# Patient Record
Sex: MALE | Race: WHITE | NOT HISPANIC OR LATINO | Employment: FULL TIME | URBAN - METROPOLITAN AREA
[De-identification: names, ages, dates, MRNs, and addresses within clinical notes are randomized per-mention and may not be internally consistent; named-entity substitution may affect disease eponyms.]

---

## 2017-01-22 ENCOUNTER — HOSPITAL ENCOUNTER (EMERGENCY)
Facility: HOSPITAL | Age: 37
Discharge: HOME | End: 2017-01-22
Attending: EMERGENCY MEDICINE | Admitting: EMERGENCY MEDICINE
Payer: COMMERCIAL

## 2017-01-22 ENCOUNTER — APPOINTMENT (EMERGENCY)
Dept: RADIOLOGY | Facility: HOSPITAL | Age: 37
End: 2017-01-22
Payer: COMMERCIAL

## 2017-01-22 ENCOUNTER — GENERIC CONVERSION - ENCOUNTER (OUTPATIENT)
Dept: OTHER | Facility: OTHER | Age: 37
End: 2017-01-22

## 2017-01-22 ENCOUNTER — APPOINTMENT (EMERGENCY)
Dept: CT IMAGING | Facility: HOSPITAL | Age: 37
End: 2017-01-22
Payer: COMMERCIAL

## 2017-01-22 VITALS
OXYGEN SATURATION: 98 % | SYSTOLIC BLOOD PRESSURE: 142 MMHG | DIASTOLIC BLOOD PRESSURE: 71 MMHG | HEIGHT: 73 IN | TEMPERATURE: 99.4 F | HEART RATE: 117 BPM | BODY MASS INDEX: 39.76 KG/M2 | WEIGHT: 300 LBS | RESPIRATION RATE: 17 BRPM

## 2017-01-22 DIAGNOSIS — N17.9 ACUTE NONTRAUMATIC KIDNEY INJURY (HCC): ICD-10-CM

## 2017-01-22 DIAGNOSIS — E86.0 MILD DEHYDRATION: Primary | ICD-10-CM

## 2017-01-22 LAB
ALBUMIN SERPL BCP-MCNC: 4.1 G/DL (ref 3.5–5)
ALP SERPL-CCNC: 50 U/L (ref 46–116)
ALT SERPL W P-5'-P-CCNC: 34 U/L (ref 12–78)
ANION GAP SERPL CALCULATED.3IONS-SCNC: 15 MMOL/L (ref 4–13)
AST SERPL W P-5'-P-CCNC: 15 U/L (ref 5–45)
BASOPHILS # BLD AUTO: 0.03 THOUSANDS/ΜL (ref 0–0.1)
BASOPHILS NFR BLD AUTO: 0 % (ref 0–1)
BILIRUB SERPL-MCNC: 1.2 MG/DL (ref 0.2–1)
BILIRUB UR QL STRIP: NEGATIVE
BUN SERPL-MCNC: 19 MG/DL (ref 5–25)
CALCIUM SERPL-MCNC: 8.8 MG/DL (ref 8.3–10.1)
CHLORIDE SERPL-SCNC: 101 MMOL/L (ref 100–108)
CLARITY UR: CLEAR
CO2 SERPL-SCNC: 23 MMOL/L (ref 21–32)
COLOR UR: YELLOW
CREAT SERPL-MCNC: 1.36 MG/DL (ref 0.6–1.3)
DEPRECATED D DIMER PPP: 450 NG/ML (FEU) (ref 0–424)
EOSINOPHIL # BLD AUTO: 0.12 THOUSAND/ΜL (ref 0–0.61)
EOSINOPHIL NFR BLD AUTO: 1 % (ref 0–6)
ERYTHROCYTE [DISTWIDTH] IN BLOOD BY AUTOMATED COUNT: 13.2 % (ref 11.6–15.1)
GFR SERPL CREATININE-BSD FRML MDRD: 58.6 ML/MIN/1.73SQ M
GLUCOSE SERPL-MCNC: 116 MG/DL (ref 65–140)
GLUCOSE SERPL-MCNC: 125 MG/DL (ref 65–140)
GLUCOSE UR STRIP-MCNC: NEGATIVE MG/DL
HCT VFR BLD AUTO: 50 % (ref 36.5–49.3)
HGB BLD-MCNC: 17.8 G/DL (ref 12–17)
HGB UR QL STRIP.AUTO: NEGATIVE
KETONES UR STRIP-MCNC: NEGATIVE MG/DL
LACTATE SERPL-SCNC: 1.9 MMOL/L (ref 0.5–2)
LEUKOCYTE ESTERASE UR QL STRIP: NEGATIVE
LYMPHOCYTES # BLD AUTO: 1 THOUSANDS/ΜL (ref 0.6–4.47)
LYMPHOCYTES NFR BLD AUTO: 8 % (ref 14–44)
MCH RBC QN AUTO: 30 PG (ref 26.8–34.3)
MCHC RBC AUTO-ENTMCNC: 35.6 G/DL (ref 31.4–37.4)
MCV RBC AUTO: 84 FL (ref 82–98)
MONOCYTES # BLD AUTO: 0.54 THOUSAND/ΜL (ref 0.17–1.22)
MONOCYTES NFR BLD AUTO: 4 % (ref 4–12)
NEUTROPHILS # BLD AUTO: 11.2 THOUSANDS/ΜL (ref 1.85–7.62)
NEUTS SEG NFR BLD AUTO: 87 % (ref 43–75)
NITRITE UR QL STRIP: NEGATIVE
NT-PROBNP SERPL-MCNC: 19 PG/ML
PH UR STRIP.AUTO: 6 [PH] (ref 4.5–8)
PLATELET # BLD AUTO: 229 THOUSANDS/UL (ref 149–390)
PMV BLD AUTO: 12 FL (ref 8.9–12.7)
POTASSIUM SERPL-SCNC: 3.9 MMOL/L (ref 3.5–5.3)
PROT SERPL-MCNC: 8.4 G/DL (ref 6.4–8.2)
PROT UR STRIP-MCNC: NEGATIVE MG/DL
RBC # BLD AUTO: 5.94 MILLION/UL (ref 3.88–5.62)
SODIUM SERPL-SCNC: 139 MMOL/L (ref 136–145)
SP GR UR STRIP.AUTO: <=1.005 (ref 1–1.03)
TROPONIN I SERPL-MCNC: <0.02 NG/ML
UROBILINOGEN UR QL STRIP.AUTO: 0.2 E.U./DL
WBC # BLD AUTO: 12.89 THOUSAND/UL (ref 4.31–10.16)

## 2017-01-22 PROCEDURE — 93005 ELECTROCARDIOGRAM TRACING: CPT | Performed by: EMERGENCY MEDICINE

## 2017-01-22 PROCEDURE — 83880 ASSAY OF NATRIURETIC PEPTIDE: CPT | Performed by: EMERGENCY MEDICINE

## 2017-01-22 PROCEDURE — 96360 HYDRATION IV INFUSION INIT: CPT

## 2017-01-22 PROCEDURE — 71010 HB CHEST X-RAY 1 VIEW FRONTAL (PORTABLE): CPT

## 2017-01-22 PROCEDURE — 80053 COMPREHEN METABOLIC PANEL: CPT | Performed by: EMERGENCY MEDICINE

## 2017-01-22 PROCEDURE — 83605 ASSAY OF LACTIC ACID: CPT | Performed by: EMERGENCY MEDICINE

## 2017-01-22 PROCEDURE — 84484 ASSAY OF TROPONIN QUANT: CPT | Performed by: EMERGENCY MEDICINE

## 2017-01-22 PROCEDURE — 96361 HYDRATE IV INFUSION ADD-ON: CPT

## 2017-01-22 PROCEDURE — 99285 EMERGENCY DEPT VISIT HI MDM: CPT

## 2017-01-22 PROCEDURE — 81003 URINALYSIS AUTO W/O SCOPE: CPT | Performed by: EMERGENCY MEDICINE

## 2017-01-22 PROCEDURE — 85379 FIBRIN DEGRADATION QUANT: CPT | Performed by: EMERGENCY MEDICINE

## 2017-01-22 PROCEDURE — 87040 BLOOD CULTURE FOR BACTERIA: CPT | Performed by: EMERGENCY MEDICINE

## 2017-01-22 PROCEDURE — 36415 COLL VENOUS BLD VENIPUNCTURE: CPT | Performed by: EMERGENCY MEDICINE

## 2017-01-22 PROCEDURE — 85025 COMPLETE CBC W/AUTO DIFF WBC: CPT | Performed by: EMERGENCY MEDICINE

## 2017-01-22 PROCEDURE — 74177 CT ABD & PELVIS W/CONTRAST: CPT

## 2017-01-22 PROCEDURE — 82948 REAGENT STRIP/BLOOD GLUCOSE: CPT

## 2017-01-22 RX ORDER — SODIUM CHLORIDE 9 MG/ML
250 INJECTION, SOLUTION INTRAVENOUS CONTINUOUS
Status: DISCONTINUED | OUTPATIENT
Start: 2017-01-22 | End: 2017-01-22 | Stop reason: HOSPADM

## 2017-01-22 RX ADMIN — IOHEXOL 50 ML: 240 INJECTION, SOLUTION INTRATHECAL; INTRAVASCULAR; INTRAVENOUS; ORAL at 09:15

## 2017-01-22 RX ADMIN — SODIUM CHLORIDE 250 ML/HR: 0.9 INJECTION, SOLUTION INTRAVENOUS at 09:08

## 2017-01-22 RX ADMIN — IOHEXOL 100 ML: 350 INJECTION, SOLUTION INTRAVENOUS at 10:54

## 2017-01-24 ENCOUNTER — ALLSCRIPTS OFFICE VISIT (OUTPATIENT)
Dept: OTHER | Facility: OTHER | Age: 37
End: 2017-01-24

## 2017-01-24 DIAGNOSIS — I10 ESSENTIAL (PRIMARY) HYPERTENSION: ICD-10-CM

## 2017-01-24 DIAGNOSIS — K21.9 GASTRO-ESOPHAGEAL REFLUX DISEASE WITHOUT ESOPHAGITIS: ICD-10-CM

## 2017-01-24 LAB
ATRIAL RATE: 137 BPM
P AXIS: 56 DEGREES
PR INTERVAL: 156 MS
QRS AXIS: 20 DEGREES
QRSD INTERVAL: 82 MS
QT INTERVAL: 270 MS
QTC INTERVAL: 407 MS
T WAVE AXIS: 48 DEGREES
VENTRICULAR RATE: 137 BPM

## 2017-01-26 ENCOUNTER — HOSPITAL ENCOUNTER (OUTPATIENT)
Dept: NON INVASIVE DIAGNOSTICS | Facility: HOSPITAL | Age: 37
Discharge: HOME | End: 2017-01-26
Attending: INTERNAL MEDICINE
Payer: COMMERCIAL

## 2017-01-26 ENCOUNTER — GENERIC CONVERSION - ENCOUNTER (OUTPATIENT)
Dept: OTHER | Facility: OTHER | Age: 37
End: 2017-01-26

## 2017-01-26 DIAGNOSIS — I10 ESSENTIAL (PRIMARY) HYPERTENSION: ICD-10-CM

## 2017-01-26 LAB
BUN SERPL-MCNC: 14 MG/DL (ref 6–20)
BUN/CREA RATIO (HISTORICAL): 12 (ref 8–19)
CALCIUM SERPL-MCNC: 9.7 MG/DL (ref 8.7–10.2)
CHLORIDE SERPL-SCNC: 100 MMOL/L (ref 96–106)
CO2 SERPL-SCNC: 25 MMOL/L (ref 18–29)
CREAT SERPL-MCNC: 1.15 MG/DL (ref 0.76–1.27)
EGFR AFRICAN AMERICAN (HISTORICAL): 93 ML/MIN/1.73
EGFR-AMERICAN CALC (HISTORICAL): 80 ML/MIN/1.73
GLUCOSE SERPL-MCNC: 92 MG/DL (ref 65–99)
POTASSIUM SERPL-SCNC: 4.4 MMOL/L (ref 3.5–5.2)
SODIUM SERPL-SCNC: 140 MMOL/L (ref 134–144)

## 2017-01-26 PROCEDURE — 93306 TTE W/DOPPLER COMPLETE: CPT

## 2017-01-27 LAB — BACTERIA BLD CULT: NORMAL

## 2017-01-30 ENCOUNTER — GENERIC CONVERSION - ENCOUNTER (OUTPATIENT)
Dept: OTHER | Facility: OTHER | Age: 37
End: 2017-01-30

## 2017-01-31 ENCOUNTER — GENERIC CONVERSION - ENCOUNTER (OUTPATIENT)
Dept: OTHER | Facility: OTHER | Age: 37
End: 2017-01-31

## 2017-02-03 ENCOUNTER — GENERIC CONVERSION - ENCOUNTER (OUTPATIENT)
Dept: OTHER | Facility: OTHER | Age: 37
End: 2017-02-03

## 2017-02-03 ENCOUNTER — HOSPITAL ENCOUNTER (OUTPATIENT)
Dept: NUCLEAR MEDICINE | Facility: HOSPITAL | Age: 37
Discharge: HOME | End: 2017-02-03
Attending: INTERNAL MEDICINE
Payer: COMMERCIAL

## 2017-02-03 DIAGNOSIS — K21.9 GASTRO-ESOPHAGEAL REFLUX DISEASE WITHOUT ESOPHAGITIS: ICD-10-CM

## 2017-02-03 PROCEDURE — 78227 HEPATOBIL SYST IMAGE W/DRUG: CPT

## 2017-02-03 PROCEDURE — A9537 TC99M MEBROFENIN: HCPCS

## 2017-02-03 RX ADMIN — SINCALIDE 2.7 MCG: 5 INJECTION, POWDER, LYOPHILIZED, FOR SOLUTION INTRAVENOUS at 12:04

## 2017-02-07 ENCOUNTER — ALLSCRIPTS OFFICE VISIT (OUTPATIENT)
Dept: OTHER | Facility: OTHER | Age: 37
End: 2017-02-07

## 2017-02-08 ENCOUNTER — GENERIC CONVERSION - ENCOUNTER (OUTPATIENT)
Dept: OTHER | Facility: OTHER | Age: 37
End: 2017-02-08

## 2017-02-10 ENCOUNTER — GENERIC CONVERSION - ENCOUNTER (OUTPATIENT)
Dept: OTHER | Facility: OTHER | Age: 37
End: 2017-02-10

## 2017-03-24 ENCOUNTER — GENERIC CONVERSION - ENCOUNTER (OUTPATIENT)
Dept: OTHER | Facility: OTHER | Age: 37
End: 2017-03-24

## 2018-01-12 NOTE — RESULT NOTES
Verified Results  ECHO COMPLETE WITH CONTRAST IF INDICATED 2017 08:41AM [facility] Order Number: [id number]    - Patient Instructions: To schedule this appointment, please contact Central Scheduling at [phone]  For Ame Sullivan, please call [phone]  Test Name Result Flag Reference   ECHO COMPLETE WITH CONTRAST IF INDICATED (Report)     [address] [facility]   [place]   [facility], [address] [facility]   [phone]     Transthoracic Echocardiogram   2D, M-mode, Doppler, and Color Doppler     Study date: 2017     Patient: Enid Campuzano   MR number: [id number]   Account number: [de-identified]   : 1980   Age: 43 years   Gender: Male   Status: Outpatient   Location: Echo lab   Height: 73 in   Weight: 308 lb   BP: 120/ 82 mmHg     Indications: Hypertension  Diagnoses: I10  - Essential (primary) hypertension     Sonographer: Colleen ZURITA, RCS   Primary Physician: Apryl Lazo DO   Referring Physician: Apryl Lazo DO   Group: [facility]done[facility]   Interpreting Physician: Latosha Brantley MD     SUMMARY     LEFT VENTRICLE:   Systolic function was normal by visual assessment  Ejection fraction was estimated to be 60 %  There were no regional wall motion abnormalities  TRICUSPID VALVE:   There was trace regurgitation  HISTORY: PRIOR HISTORY: GERD,Hypertension  PROCEDURE: The procedure was performed in the echo lab  This was a routine study  The transthoracic approach was used  The study included complete 2D imaging, M-mode, complete spectral Doppler, and color Doppler  Images were obtained from   the parasternal, apical, subcostal, and suprasternal notch acoustic windows  Image quality was adequate  LEFT VENTRICLE: Size was normal  Systolic function was normal by visual assessment  Ejection fraction was estimated to be 60 %  There were no regional wall motion abnormalities   Wall thickness was normal  DOPPLER: The ratio of early ventricular filling to atrial contraction velocities was within the normal range  Left ventricular diastolic function parameters were normal      RIGHT VENTRICLE: The size was normal  Systolic function was normal  DOPPLER: The tricuspid jet envelope definition was inadequate for estimation of RV systolic pressure  There are no indirect findings (abnormal RV volume or geometry,   altered pulmonary flow velocity profile, or leftward septal displacement) which would suggest moderate or severe pulmonary hypertension  LEFT ATRIUM: Size was normal      RIGHT ATRIUM: Size was normal      MITRAL VALVE: Valve structure was normal  There was normal leaflet separation  DOPPLER: The transmitral velocity was within the normal range  There was no evidence for stenosis  There was no regurgitation  AORTIC VALVE: The valve was probably trileaflet  Leaflets exhibited normal thickness and normal cuspal separation  DOPPLER: Transaortic velocity was within the normal range  There was no evidence for stenosis  There was no regurgitation  TRICUSPID VALVE: The valve structure was normal  There was normal leaflet separation  DOPPLER: The transtricuspid velocity was within the normal range  There was no evidence for stenosis  There was trace regurgitation  PULMONIC VALVE: Leaflets exhibited normal thickness, no calcification, and normal cuspal separation  DOPPLER: The transpulmonic velocity was within the normal range  There was no regurgitation  PERICARDIUM: There was no pericardial effusion  AORTA: The root exhibited normal size  There was no evidence of coarctation, either anatomically, or by Doppler flow parameters  SYSTEMIC VEINS: IVC: The inferior vena cava was not well visualized       SYSTEM MEASUREMENT TABLES     2D   %FS: 25 15 %   Ao Diam: 3 67 cm   EDV(Teich): 84 07 ml   EF(Teich): 49 97 %   ESV(Cube): 33 85 ml   ESV(Teich): 42 05 ml   IVSd: 1 11 cm   LA Area: 12 82 cm2   LA Diam: 2 66 cm   LVEDV MOD A4C: 65 26 ml   LVEF MOD A4C: 54 18 %   LVESV MOD A4C: 29 9 ml   LVIDd: 4 32 cm   LVIDs: 3 23 cm   LVLd A4C: 7 63 cm   LVLs A4C: 7 2 cm   LVOT Diam: 2 43 cm   LVPWd: 1 21 cm   RA Area: 11 54 cm2   RV Diam : 4 3 cm   SV MOD A4C: 35 36 ml   SV(Cube): 46 87 ml   SV(Teich): 42 01 ml     MM   TAPSE: 1 99 cm     PW   AVC: 318 71 ms   E': 0 08 m/s   E/E': 6 76   MV A Berto: 0 53 m/s   MV Dec [place]: 2 03 m/s2   MV DecT: 262 45 ms   MV E Berto: 0 53 m/s   MV E/A Ratio: 1     [employer] Accredited Echocardiography Laboratory     Prepared and electronically signed by     Carole Kemp MD   Signed 26-Jan-2017 12:43:35

## 2018-01-12 NOTE — RESULT NOTES
Verified Results  * NM Albert Caicedo RX [id number] 11:05AM Nirmala Grigsby Order Number: [id number]    - Patient Instructions: To schedule this appointment, please contact Central Scheduling at [phone]  Test Name Result Flag Reference   NM HEPATOBILIARY W RX (Report)     HEPATOBILIARY SCAN WITH CHOLECYSTOKININ ADMINISTRATION      INDICATION: Right upper quadrant pain     COMPARISON: None available     TECHNIQUE:    Following the intravenous administration of 5 1 mCi Tc-99m labeled mebrofenin, dynamic abdominal images were obtained over a 60 minute time period  Images were performed in AP projection  FINDINGS:      There is prompt, uniform accumulation with normal clearance of the radiopharmaceutical by the liver  There is normal filling of the intrahepatic ducts, common bile duct and gallbladder with normal excretion of the radiopharmaceutical into the duodenum        In order to evaluate the contractile response of the gallbladder in response to cholecystokinin, 2 7 mcg sincalide (0 02 mcg/kg) was administered by slow intravenous infusion over 60 minutes  These images demonstrate diminished contraction of the    gallbladder  The calculated gallbladder ejection fraction is 34 % (N = >35%)  The patient experienced mild right abdominal pain after CCK administration       IMPRESSION:     1  Diminished contractile response of the gallbladder to cholecystokinin infusion  This may be associated with chronic gallbladder dysfunction in the appropriate setting         Workstation performed: [id number]     Signed by:   Gerald Da Silva MD   2/3/17

## 2018-01-13 NOTE — MISCELLANEOUS
Message  Return to work or school:   Vianey Sebastian is under my professional care   He was seen in my office on 02/07/2017   He is able to return to work on  02/13/2017            Signatures   Electronically signed by : TAMARA Walker ; Feb 14 2017  8:22PM EST

## 2018-01-14 VITALS
HEART RATE: 60 BPM | TEMPERATURE: 96.5 F | BODY MASS INDEX: 39.88 KG/M2 | SYSTOLIC BLOOD PRESSURE: 112 MMHG | WEIGHT: 302.31 LBS | DIASTOLIC BLOOD PRESSURE: 86 MMHG

## 2018-01-14 VITALS
BODY MASS INDEX: 40.64 KG/M2 | HEART RATE: 80 BPM | SYSTOLIC BLOOD PRESSURE: 120 MMHG | DIASTOLIC BLOOD PRESSURE: 82 MMHG | WEIGHT: 308 LBS

## 2018-01-16 NOTE — MISCELLANEOUS
Message  Message Free Text Note Form: Patient was seen by Dr Yo Saunders in the ER, 43year old with fever and SOB  Does not have a primary care physician  We will take him in our office  Will be calling Monday for an appointment  Put him in with Dr Alanna Saunders later in the week        Signatures   Electronically signed by : Jeanette Polo DO; Jan 24 2017  8:35AM EST                       (Author)

## 2018-08-24 ENCOUNTER — OFFICE VISIT (OUTPATIENT)
Dept: FAMILY MEDICINE CLINIC | Facility: HOSPITAL | Age: 38
End: 2018-08-24
Payer: COMMERCIAL

## 2018-08-24 VITALS
WEIGHT: 315 LBS | BODY MASS INDEX: 41.75 KG/M2 | SYSTOLIC BLOOD PRESSURE: 142 MMHG | DIASTOLIC BLOOD PRESSURE: 96 MMHG | HEIGHT: 73 IN | HEART RATE: 89 BPM

## 2018-08-24 DIAGNOSIS — R00.0 TACHYCARDIA: ICD-10-CM

## 2018-08-24 DIAGNOSIS — I10 BENIGN ESSENTIAL HYPERTENSION: Primary | ICD-10-CM

## 2018-08-24 DIAGNOSIS — E66.01 MORBID OBESITY (HCC): Chronic | ICD-10-CM

## 2018-08-24 DIAGNOSIS — M54.50 CHRONIC MIDLINE LOW BACK PAIN WITHOUT SCIATICA: ICD-10-CM

## 2018-08-24 DIAGNOSIS — K21.9 GERD WITHOUT ESOPHAGITIS: ICD-10-CM

## 2018-08-24 DIAGNOSIS — G89.29 CHRONIC MIDLINE LOW BACK PAIN WITHOUT SCIATICA: ICD-10-CM

## 2018-08-24 PROBLEM — E78.1 HYPERTRIGLYCERIDEMIA: Status: ACTIVE | Noted: 2018-08-24

## 2018-08-24 PROBLEM — R51.9 FREQUENT HEADACHES: Status: ACTIVE | Noted: 2017-01-24

## 2018-08-24 PROBLEM — M54.9 CHRONIC BACK PAIN: Status: ACTIVE | Noted: 2017-01-24

## 2018-08-24 PROCEDURE — 3008F BODY MASS INDEX DOCD: CPT | Performed by: INTERNAL MEDICINE

## 2018-08-24 PROCEDURE — 99214 OFFICE O/P EST MOD 30 MIN: CPT | Performed by: INTERNAL MEDICINE

## 2018-08-24 PROCEDURE — 1036F TOBACCO NON-USER: CPT | Performed by: INTERNAL MEDICINE

## 2018-08-24 RX ORDER — LOSARTAN POTASSIUM 100 MG/1
100 TABLET ORAL DAILY
Qty: 90 TABLET | Refills: 3 | Status: SHIPPED | OUTPATIENT
Start: 2018-08-24 | End: 2019-03-29 | Stop reason: DRUGHIGH

## 2018-08-24 RX ORDER — PANTOPRAZOLE SODIUM 40 MG/1
1 TABLET, DELAYED RELEASE ORAL DAILY
COMMUNITY
Start: 2017-01-24 | End: 2018-08-24 | Stop reason: ALTCHOICE

## 2018-08-24 NOTE — PATIENT INSTRUCTIONS
Core Strengthening Exercises   WHAT YOU NEED TO KNOW:   Your core includes the muscles of your lower back, hip, pelvis, and abdomen  Core strengthening exercises help heal and strengthen these muscles  This helps prevent another injury, and keeps your pelvis, spine, and hips in the correct position  DISCHARGE INSTRUCTIONS:   Contact your healthcare provider if:   · You have sharp or worsening pain during exercise or at rest     · You have questions or concerns about your shoulder exercises  Safety tips:  Talk to your healthcare provider before you start an exercise program  A physical therapist can teach you how to do core strengthening exercises safely  · Do the exercises on a mat or firm surface  A firm surface will support your spine and avoid low back pain  Do not do these exercises on a bed  · Move slowly and smoothly  Avoid fast or jerky motions  · Stop if you feel pain  Core exercises should not feel painful  Stop if you feel pain  · Breathe normally during core exercises  Do not hold your breath  This may cause an increase in blood pressure and prevent muscle strengthening  Your healthcare provider will tell you when to inhale and exhale during the exercise  · Begin all of your exercises with abdominal bracing  Abdominal bracing helps warm up your core muscles  You can also practice abdominal bracing throughout the day while you are sitting or standing  Lie on your back with your knees bent and feet flat on the floor  Place your arms in a relaxed position beside your body  Tighten your abdominal muscles  Pull your belly button in and up toward your spine  Hold for 5 seconds  Relax your muscles  Repeat 10 times  Core strengthening exercises: Your healthcare provider will tell you how often to do these exercises  The provider will also tell you how many repetitions of each exercise you should do  Hold each exercise for 5 seconds or as directed   As you get stronger, increase your hold to 10 to 15 seconds  You can do some of these exercises on a stability ball, or with a weight  Ask your healthcare provider how to use a stability ball or weight for these exercises:  · Bent leg side bridge:  Lie on one side with your legs, hips, and shoulders in a straight line  Prop yourself up onto your forearm so your elbow is directly below your shoulder  Bend your knees to 90°  Lift your hips off the floor  Balance yourself on your forearm and the side of your knee  Hold this position  Repeat on the other side  · Straight leg side bridge:  Lie on one side with your legs, hips, and shoulders in a straight line  Prop yourself up onto your forearm so your elbow is directly below your shoulder  Your top leg can rest in front of your bottom leg for support  Lift your hips off the floor  Balance yourself on your forearm and the outside of your flexed foot  Do not let your ankle bend sideways  Hold this position  Repeat on the other side  · Superman:  Lie on your stomach  Extend your arms forward on the floor  Tighten your abdominal muscles and lift your right hand and left leg off the floor  Hold this position  Slowly return to the starting position  Tighten your abdominal muscles and lift your left hand and right leg off the floor  Hold this position  Slowly return to the starting position  · Curl up:  Lie on your back with your knees bent and feet flat on the floor  Place your hands, palms down, underneath your lower back  Next, with your elbows on the floor, lift your shoulders and chest 2 to 3 inches off the floor  Keep your head in line with your shoulders  Hold this position  Slowly return to the starting position  · Straight leg raises:  Lie on your back with one leg straight  Bend the other knee and place your foot flat on the floor  Tighten your abdominal muscles  Keep your leg straight and slowly lift it straight up 6 to 12 inches off the floor   Hold this position  Lower your leg slowly  Do as many repetitions as directed on this side  Repeat with the other leg  · Plank:  Lie on your stomach  Bend your elbows and place your forearms flat on the floor  Lift your chest, stomach, and knees off the floor  Make sure your elbows are below your shoulders  Your body should be in a straight line  Do not let your hips or lower back sink to the ground  Squeeze your abdominal muscles together and hold for 15 seconds  To make this exercise harder, hold for 30 seconds or lift 1 leg at a time  · Bicycles:  Lie on your back  Bend both knees and bring them toward your chest  Your calves should be parallel to the floor  Place the palms of your hands on the back of your head  Straighten your right leg and keep it lifted 2 inches off the floor  Raise your head and shoulders off the floor and twist towards your left  Keep your head and shoulders lifted  Bend your right knee while you straighten your left leg  Keep your left leg 2 inches off the floor  Twist your head and chest towards the left leg  Continue to straighten 1 leg at a time and twist        Follow up with your healthcare provider as directed:  Write down your questions so you remember to ask them during your visits  © 2017 [address] Information is for End User's use only and may not be sold, redistributed or otherwise used for commercial purposes  All illustrations and images included in CareNotes® are the copyrighted property of [employer]  or Lamonte French  The above information is an  only  It is not intended as medical advice for individual conditions or treatments  Talk to your doctor, nurse or pharmacist before following any medical regimen to see if it is safe and effective for you

## 2018-08-24 NOTE — ASSESSMENT & PLAN NOTE
No severe symptoms currently - had been on protonix in past  Had prior egd with Dr Dania Spaulding   Discussed avoiding Nsaids also

## 2018-08-24 NOTE — ASSESSMENT & PLAN NOTE
Had stopped his ramipril about 2 months ago- mix up with rx at pharmacy and rx d/annie  He was following on home monitor and doing workout at gym and dietary for first  Month was ok then increasing- was over 160 last night    Had some dry cough with ACE in past- discussed starting losartan instead

## 2018-08-24 NOTE — PROGRESS NOTES
Assessment/Plan:             Problem List Items Addressed This Visit        Digestive    GERD without esophagitis     No severe symptoms currently - had been on protonix in past  Had prior egd with Dr Dick Rizvi  Discussed avoiding Nsaids also            Cardiovascular and Mediastinum    Benign essential hypertension - Primary     Had stopped his ramipril about 2 months ago- mix up with rx at pharmacy and rx d/annie  He was following on home monitor and doing workout at gym and dietary for first  Month was ok then increasing- was over 160 last night  Had some dry cough with ACE in past- discussed starting losartan instead            Other    Chronic back pain     Works at [employer] and has some AM stiffness  Drives an hour to work and bothers him if sitting too long                 Subjective:      Patient ID: Joaquim Ulloa is a 42 y o  male    1 htn- some occasional am headache which he attributes to his sinuses  2  Obesity- discussed organized weight loss programs- max was 330-  3  Lipids- had yyearly screening at work- had elevated triglycerides- he will get us a copy from work  Hypertension   Pertinent negatives include no chest pain or shortness of breath  The following portions of the patient's history were reviewed and updated as appropriate: allergies, current medications and problem list      Review of Systems   Constitutional: Negative for chills, fatigue and fever  HENT: Negative for congestion, postnasal drip and rhinorrhea  Respiratory: Negative for apnea and shortness of breath  Cardiovascular: Negative for chest pain  High heart rates at times 90- 105 at rest   Gastrointestinal: Negative for abdominal distention and abdominal pain  All other systems reviewed and are negative  Objective:    No current outpatient prescriptions on file  Blood pressure 142/96, pulse 89, height 6' 1" (1 854 m), weight (!) 143 kg (316 lb)       Physical Exam   Constitutional: He is oriented to person, place, and time  He appears well-developed and well-nourished  No distress  HENT:   Head: Normocephalic  Right Ear: External ear normal    Left Ear: External ear normal    Nose: Nose normal    Mouth/Throat: Oropharynx is clear and moist  No oropharyngeal exudate  Eyes: Right eye exhibits no discharge  Left eye exhibits no discharge  Neck: No JVD present  Cardiovascular: Normal rate and regular rhythm  No murmur heard  Hr 88   Pulmonary/Chest: Effort normal and breath sounds normal  No respiratory distress  He has no wheezes  He has no rales  Abdominal: Soft  Bowel sounds are normal  There is no tenderness  There is no guarding  obese   Musculoskeletal: Normal range of motion  He exhibits no edema or deformity  Neurological: He is alert and oriented to person, place, and time  No cranial nerve deficit  He exhibits normal muscle tone  Skin: Skin is warm and dry  Psychiatric: He has a normal mood and affect  His behavior is normal  Judgment and thought content normal    Nursing note and vitals reviewed

## 2018-08-24 NOTE — ASSESSMENT & PLAN NOTE
Works at [employer] and has some AM stiffness   Drives an hour to work and bothers him if sitting too long

## 2019-03-29 ENCOUNTER — OFFICE VISIT (OUTPATIENT)
Dept: FAMILY MEDICINE CLINIC | Facility: HOSPITAL | Age: 39
End: 2019-03-29
Payer: COMMERCIAL

## 2019-03-29 VITALS
HEIGHT: 73 IN | WEIGHT: 315 LBS | HEART RATE: 92 BPM | DIASTOLIC BLOOD PRESSURE: 86 MMHG | SYSTOLIC BLOOD PRESSURE: 142 MMHG | BODY MASS INDEX: 41.75 KG/M2

## 2019-03-29 DIAGNOSIS — J45.20 MILD INTERMITTENT ASTHMA WITHOUT COMPLICATION: ICD-10-CM

## 2019-03-29 DIAGNOSIS — K21.9 GERD WITHOUT ESOPHAGITIS: ICD-10-CM

## 2019-03-29 DIAGNOSIS — R06.83 SNORING: ICD-10-CM

## 2019-03-29 DIAGNOSIS — I10 BENIGN ESSENTIAL HYPERTENSION: Primary | ICD-10-CM

## 2019-03-29 DIAGNOSIS — E66.01 MORBID OBESITY (HCC): Chronic | ICD-10-CM

## 2019-03-29 DIAGNOSIS — E78.1 HYPERTRIGLYCERIDEMIA: ICD-10-CM

## 2019-03-29 PROCEDURE — 99214 OFFICE O/P EST MOD 30 MIN: CPT | Performed by: INTERNAL MEDICINE

## 2019-03-29 PROCEDURE — 1036F TOBACCO NON-USER: CPT | Performed by: INTERNAL MEDICINE

## 2019-03-29 PROCEDURE — 3008F BODY MASS INDEX DOCD: CPT | Performed by: INTERNAL MEDICINE

## 2019-03-29 RX ORDER — ALBUTEROL SULFATE 90 UG/1
2 AEROSOL, METERED RESPIRATORY (INHALATION) EVERY 6 HOURS PRN
Qty: 1 INHALER | Refills: 0 | Status: SHIPPED | OUTPATIENT
Start: 2019-03-29 | End: 2019-04-28

## 2019-03-29 RX ORDER — IRBESARTAN 150 MG/1
150 TABLET ORAL
Qty: 90 TABLET | Refills: 3 | Status: SHIPPED | OUTPATIENT
Start: 2019-03-29 | End: 2019-07-15 | Stop reason: ALTCHOICE

## 2019-03-29 NOTE — PATIENT INSTRUCTIONS
Change from cozaar to avapro   followup apt in   Obesity   AMBULATORY CARE:   Obesity  is when your body mass index (BMI) is greater than 30  Your healthcare provider will use your height and weight to measure your BMI  The risks of obesity include  many health problems, such as injuries or physical disability  You may need tests to check for the following:  · Diabetes     · High blood pressure or high cholesterol     · Heart disease     · Gallbladder or liver disease     · Cancer of the colon, breast, prostate, liver, or kidney     · Sleep apnea     · Arthritis or gout  Seek care immediately if:   · You have a severe headache, confusion, or difficulty speaking  · You have weakness on one side of your body  · You have chest pain, sweating, or shortness of breath  Contact your healthcare provider if:   · You have symptoms of gallbladder or liver disease, such as pain in your upper abdomen  · You have knee or hip pain and discomfort while walking  · You have symptoms of diabetes, such as intense hunger and thirst, and frequent urination  · You have symptoms of sleep apnea, such as snoring or daytime sleepiness  · You have questions or concerns about your condition or care  Treatment for obesity  focuses on helping you lose weight to improve your health  Even a small decrease in BMI can reduce the risk for many health problems  Your healthcare provider will help you set a weight-loss goal   · Lifestyle changes  are the first step in treating obesity  These include making healthy food choices and getting regular physical activity  Your healthcare provider may suggest a weight-loss program that involves coaching, education, and therapy  · Medicine  may help you lose weight when it is used with a healthy diet and physical activity  · Surgery  can help you lose weight if you are very obese and have other health problems  There are several types of weight-loss surgery   Ask your healthcare provider for more information  Be successful losing weight:   · Set small, realistic goals  An example of a small goal is to walk for 20 minutes 5 days a week  Anther goal is to lose 5% of your body weight  · Tell friends, family members, and coworkers about your goals  and ask for their support  Ask a friend to lose weight with you, or join a weight-loss support group  · Identify foods or triggers that may cause you to overeat , and find ways to avoid them  Remove tempting high-calorie foods from your home and workplace  Place a bowl of fresh fruit on your kitchen counter  If stress causes you to eat, then find other ways to cope with stress  · Keep a diary to track what you eat and drink  Also write down how many minutes of physical activity you do each day  Weigh yourself once a week and record it in your diary  Eating changes: You will need to eat 500 to 1,000 fewer calories each day than you currently eat to lose 1 to 2 pounds a week  The following changes will help you cut calories:  · Eat smaller portions  Use small plates, no larger than 9 inches in diameter  Fill your plate half full of fruits and vegetables  Measure your food using measuring cups until you know what a serving size looks like  · Eat 3 meals and 1 or 2 snacks each day  Plan your meals in advance  Moiz Huitron and eat at home most of the time  Eat slowly  · Eat fruits and vegetables at every meal   They are low in calories and high in fiber, which makes you feel full  Do not add butter, margarine, or cream sauce to vegetables  Use herbs to season steamed vegetables  · Eat less fat and fewer fried foods  Eat more baked or grilled chicken and fish  These protein sources are lower in calories and fat than red meat  Limit fast food  Dress your salads with olive oil and vinegar instead of bottled dressing  · Limit the amount of sugar you eat  Do not drink sugary beverages  Limit alcohol    Activity changes:  Physical activity is good for your body in many ways  It helps you burn calories and build strong muscles  It decreases stress and depression, and improves your mood  It can also help you sleep better  Talk to your healthcare provider before you begin an exercise program   · Exercise for at least 30 minutes 5 days a week  Start slowly  Set aside time each day for physical activity that you enjoy and that is convenient for you  It is best to do both weight training and an activity that increases your heart rate, such as walking, bicycling, or swimming  · Find ways to be more active  Do yard work and housecleaning  Walk up the stairs instead of using elevators  Spend your leisure time going to events that require walking, such as outdoor festivals or fairs  This extra physical activity can help you lose weight and keep it off  Follow up with your healthcare provider as directed: You may need to meet with a dietitian  Write down your questions so you remember to ask them during your visits  © 2017 [address] [employer] Information is for End User's use only and may not be sold, redistributed or otherwise used for commercial purposes  All illustrations and images included in CareNotes® are the copyrighted property of [employer]  or Lamonte French  The above information is an  only  It is not intended as medical advice for individual conditions or treatments  Talk to your doctor, nurse or pharmacist before following any medical regimen to see if it is safe and effective for you

## 2019-03-29 NOTE — PROGRESS NOTES
Assessment/Plan:             Problem List Items Addressed This Visit        Digestive    GERD without esophagitis       Respiratory    Mild intermittent asthma without complication     Will refill proventil-had pft at work physical         Relevant Medications    albuterol (PROVENTIL HFA,VENTOLIN HFA) 90 mcg/act inhaler       Cardiovascular and Mediastinum    Benign essential hypertension - Primary     Had switched from ramipril (due to cough)  to losartan- feels he is peeing more on this- not on diuretic form with that- missing at times with working swing shift  Relevant Medications    irbesartan (AVAPRO) 150 mg tablet    Other Relevant Orders    CBC and differential    Comprehensive metabolic panel    Lipid Panel with Direct LDL reflex       Other    Morbid obesity ([address] ) (Chronic)     Takes food occasionally or buys on way to work         Relevant Orders    TSH, 3rd generation with Free T4 reflex    Testosterone    Ambulatory referral to Pulmonology    Hypertriglyceridemia    Relevant Orders    Comprehensive metabolic panel    Lipid Panel with Direct LDL reflex      Other Visit Diagnoses     Snoring        Relevant Orders    Ambulatory referral to Pulmonology            Subjective:      Patient ID: [facility] YNES Piper is a 34 y o  male    1  will switch from losartan to irbesartan daily  2  Snoring-- some daytime issues - also on swing shift    Hypertension   This is a chronic problem  The current episode started more than 1 year ago  Associated symptoms include sweats  Pertinent negatives include no chest pain, palpitations, peripheral edema or shortness of breath  The following portions of the patient's history were reviewed and updated as appropriate: allergies, current medications and problem list      Review of Systems   Respiratory: Negative for shortness of breath  Cardiovascular: Negative for chest pain and palpitations  Musculoskeletal: Positive for back pain          Improving   All other systems reviewed and are negative  Objective:      Current Outpatient Medications:     albuterol (PROVENTIL HFA,VENTOLIN HFA) 90 mcg/act inhaler, Inhale 2 puffs every 6 (six) hours as needed for wheezing for up to 30 days, Disp: 1 Inhaler, Rfl: 0    irbesartan (AVAPRO) 150 mg tablet, Take 1 tablet (150 mg total) by mouth daily at bedtime, Disp: 90 tablet, Rfl: 3    Blood pressure 142/86, pulse 92, height 6' 1" (1 854 m), weight (!) 151 kg (332 lb)  Physical Exam   Constitutional: He is oriented to person, place, and time  He appears well-developed and well-nourished  No distress  HENT:   Head: Normocephalic  Right Ear: External ear normal    Left Ear: External ear normal    Hair in canal   Eyes: Right eye exhibits no discharge  Left eye exhibits no discharge  Neck: Normal range of motion  Neck supple  No tracheal deviation present  No thyromegaly present  Cardiovascular: Normal rate and regular rhythm  Exam reveals no friction rub  No murmur heard  Pulmonary/Chest: Effort normal and breath sounds normal  No stridor  No respiratory distress  Abdominal: Soft  Bowel sounds are normal  He exhibits no distension  There is no tenderness  Musculoskeletal: He exhibits no edema or deformity  Mild lower lumbar spasm of paravertebral muscles   Neurological: He is alert and oriented to person, place, and time  Skin: Skin is warm and dry  No rash noted  Nursing note and vitals reviewed  BMI Counseling: Body mass index is 43 8 kg/m²  Discussed the patient's BMI with him  The BMI is above average  BMI counseling and education was provided to the patient  Nutrition recommendations include 3-5 servings of fruits/vegetables daily     Discussed regular exercise

## 2019-03-29 NOTE — ASSESSMENT & PLAN NOTE
Had switched from ramipril (due to cough)  to losartan- feels he is peeing more on this- not on diuretic form with that- missing at times with working swing shift

## 2019-07-15 ENCOUNTER — OFFICE VISIT (OUTPATIENT)
Dept: FAMILY MEDICINE CLINIC | Facility: HOSPITAL | Age: 39
End: 2019-07-15
Payer: COMMERCIAL

## 2019-07-15 VITALS
WEIGHT: 315 LBS | SYSTOLIC BLOOD PRESSURE: 162 MMHG | HEIGHT: 73 IN | OXYGEN SATURATION: 98 % | DIASTOLIC BLOOD PRESSURE: 100 MMHG | BODY MASS INDEX: 41.75 KG/M2 | HEART RATE: 102 BPM

## 2019-07-15 DIAGNOSIS — J45.20 MILD INTERMITTENT ASTHMA WITHOUT COMPLICATION: ICD-10-CM

## 2019-07-15 DIAGNOSIS — E66.01 MORBID OBESITY (HCC): Chronic | ICD-10-CM

## 2019-07-15 DIAGNOSIS — K21.9 GERD WITHOUT ESOPHAGITIS: ICD-10-CM

## 2019-07-15 DIAGNOSIS — L84 FOOT CALLUS: ICD-10-CM

## 2019-07-15 DIAGNOSIS — I10 BENIGN ESSENTIAL HYPERTENSION: Primary | ICD-10-CM

## 2019-07-15 PROCEDURE — 1036F TOBACCO NON-USER: CPT | Performed by: INTERNAL MEDICINE

## 2019-07-15 PROCEDURE — 99214 OFFICE O/P EST MOD 30 MIN: CPT | Performed by: INTERNAL MEDICINE

## 2019-07-15 PROCEDURE — 3008F BODY MASS INDEX DOCD: CPT | Performed by: INTERNAL MEDICINE

## 2019-07-15 RX ORDER — VALSARTAN 160 MG/1
160 TABLET ORAL DAILY
Qty: 90 TABLET | Refills: 3 | Status: SHIPPED | OUTPATIENT
Start: 2019-07-15 | End: 2020-08-13 | Stop reason: SDUPTHER

## 2019-07-15 NOTE — PROGRESS NOTES
Assessment/Plan:             Problem List Items Addressed This Visit        Digestive    GERD without esophagitis     No recent  Flare of symptoms            Respiratory    Mild intermittent asthma without complication     Live upper congestion than wheezing this summer- on right side  Is worse with congestion            Cardiovascular and Mediastinum    Benign essential hypertension - Primary     Will switch form losartan to valsartan and if needed will add amlodipine         Relevant Medications    valsartan (DIOVAN) 160 mg tablet       Other    Morbid obesity (HCC) (Chronic)            Subjective:      Patient ID: Ally Piper is a 34 y o  male    1  htn- will switch from losartan to valsartan    2  Lipids- do labs at [facility]= fasting      The following portions of the patient's history were reviewed and updated as appropriate: allergies, current medications and problem list      Review of Systems   Constitutional: Negative for fatigue  Restarting exercise program   All other systems reviewed and are negative  Objective:      Current Outpatient Medications:     valsartan (DIOVAN) 160 mg tablet, Take 1 tablet (160 mg total) by mouth daily, Disp: 90 tablet, Rfl: 3    Blood pressure 162/100, pulse 102, height 6' 1" (1 854 m), weight (!) 152 kg (335 lb), SpO2 98 %  Physical Exam   Constitutional: He is oriented to person, place, and time  He appears well-developed and well-nourished  No distress  HENT:   Head: Normocephalic  Right Ear: External ear normal    Left Ear: External ear normal    Hair in canal   Eyes: Right eye exhibits no discharge  Left eye exhibits no discharge  Neck: Normal range of motion  Neck supple  No tracheal deviation present  No thyromegaly present  Cardiovascular: Normal rate and regular rhythm  Exam reveals no friction rub  No murmur heard  Pulmonary/Chest: Effort normal and breath sounds normal  No stridor  No respiratory distress  Abdominal: Soft   Bowel sounds are normal  He exhibits no distension  There is no tenderness  Musculoskeletal: He exhibits no edema or deformity  Less lumbar spasm    Neurological: He is alert and oriented to person, place, and time  Skin: Skin is warm and dry  No rash noted  Nursing note and vitals reviewed

## 2020-05-10 ENCOUNTER — TELEPHONE (OUTPATIENT)
Dept: OTHER | Facility: OTHER | Age: 40
End: 2020-05-10

## 2020-05-11 ENCOUNTER — TELEMEDICINE (OUTPATIENT)
Dept: FAMILY MEDICINE CLINIC | Facility: HOSPITAL | Age: 40
End: 2020-05-11
Payer: COMMERCIAL

## 2020-05-11 VITALS
SYSTOLIC BLOOD PRESSURE: 140 MMHG | DIASTOLIC BLOOD PRESSURE: 89 MMHG | HEART RATE: 72 BPM | BODY MASS INDEX: 39.76 KG/M2 | WEIGHT: 300 LBS | HEIGHT: 73 IN

## 2020-05-11 DIAGNOSIS — Z20.828 EXPOSURE TO SARS-ASSOCIATED CORONAVIRUS: ICD-10-CM

## 2020-05-11 DIAGNOSIS — Z20.828 EXPOSURE TO SARS-ASSOCIATED CORONAVIRUS: Primary | ICD-10-CM

## 2020-05-11 DIAGNOSIS — J45.20 MILD INTERMITTENT ASTHMA WITHOUT COMPLICATION: ICD-10-CM

## 2020-05-11 PROCEDURE — 3077F SYST BP >= 140 MM HG: CPT | Performed by: INTERNAL MEDICINE

## 2020-05-11 PROCEDURE — U0003 INFECTIOUS AGENT DETECTION BY NUCLEIC ACID (DNA OR RNA); SEVERE ACUTE RESPIRATORY SYNDROME CORONAVIRUS 2 (SARS-COV-2) (CORONAVIRUS DISEASE [COVID-19]), AMPLIFIED PROBE TECHNIQUE, MAKING USE OF HIGH THROUGHPUT TECHNOLOGIES AS DESCRIBED BY CMS-2020-01-R: HCPCS

## 2020-05-11 PROCEDURE — 99214 OFFICE O/P EST MOD 30 MIN: CPT | Performed by: INTERNAL MEDICINE

## 2020-05-11 PROCEDURE — 3079F DIAST BP 80-89 MM HG: CPT | Performed by: INTERNAL MEDICINE

## 2020-05-11 PROCEDURE — 3008F BODY MASS INDEX DOCD: CPT | Performed by: INTERNAL MEDICINE

## 2020-05-11 RX ORDER — ALBUTEROL SULFATE 90 UG/1
2 AEROSOL, METERED RESPIRATORY (INHALATION) EVERY 6 HOURS PRN
Qty: 1 INHALER | Refills: 5 | Status: SHIPPED | OUTPATIENT
Start: 2020-05-11 | End: 2021-04-15 | Stop reason: SDUPTHER

## 2020-05-12 LAB — SARS-COV-2 RNA SPEC QL NAA+PROBE: NOT DETECTED

## 2020-08-13 DIAGNOSIS — I10 BENIGN ESSENTIAL HYPERTENSION: ICD-10-CM

## 2020-08-14 RX ORDER — VALSARTAN 160 MG/1
160 TABLET ORAL DAILY
Qty: 90 TABLET | Refills: 3 | Status: SHIPPED | OUTPATIENT
Start: 2020-08-14 | End: 2021-08-06 | Stop reason: SDUPTHER

## 2021-04-15 ENCOUNTER — TELEMEDICINE (OUTPATIENT)
Dept: FAMILY MEDICINE CLINIC | Facility: HOSPITAL | Age: 41
End: 2021-04-15
Payer: COMMERCIAL

## 2021-04-15 VITALS — BODY MASS INDEX: 39.58 KG/M2 | WEIGHT: 300 LBS

## 2021-04-15 DIAGNOSIS — J06.9 VIRAL UPPER RESPIRATORY TRACT INFECTION: ICD-10-CM

## 2021-04-15 DIAGNOSIS — Z20.822 CLOSE EXPOSURE TO COVID-19 VIRUS: ICD-10-CM

## 2021-04-15 DIAGNOSIS — R06.02 SOB (SHORTNESS OF BREATH): ICD-10-CM

## 2021-04-15 DIAGNOSIS — J45.20 MILD INTERMITTENT ASTHMA WITHOUT COMPLICATION: ICD-10-CM

## 2021-04-15 DIAGNOSIS — Z20.822 CLOSE EXPOSURE TO COVID-19 VIRUS: Primary | ICD-10-CM

## 2021-04-15 PROCEDURE — U0003 INFECTIOUS AGENT DETECTION BY NUCLEIC ACID (DNA OR RNA); SEVERE ACUTE RESPIRATORY SYNDROME CORONAVIRUS 2 (SARS-COV-2) (CORONAVIRUS DISEASE [COVID-19]), AMPLIFIED PROBE TECHNIQUE, MAKING USE OF HIGH THROUGHPUT TECHNOLOGIES AS DESCRIBED BY CMS-2020-01-R: HCPCS | Performed by: PHYSICIAN ASSISTANT

## 2021-04-15 PROCEDURE — U0005 INFEC AGEN DETEC AMPLI PROBE: HCPCS | Performed by: PHYSICIAN ASSISTANT

## 2021-04-15 PROCEDURE — 99213 OFFICE O/P EST LOW 20 MIN: CPT | Performed by: PHYSICIAN ASSISTANT

## 2021-04-15 RX ORDER — ALBUTEROL SULFATE 90 UG/1
2 AEROSOL, METERED RESPIRATORY (INHALATION) EVERY 6 HOURS PRN
Qty: 1 INHALER | Refills: 5 | Status: SHIPPED | OUTPATIENT
Start: 2021-04-15

## 2021-04-15 NOTE — PROGRESS NOTES
Virtual Regular Visit      Assessment/Plan:    URI- viral- recommend saline nasal flushes  SOB- continue to use inhaler  Close exposure to COVID-  Recommend rest,  Deep breathing exercises, continue using  Inhaler, and vitamin D 2000, vitamin C 2000 plus  A multivitamin-  Sent to get COVID test at urgent   Care center  Reason for visit is   Chief Complaint   Patient presents with    COVID-19    Virtual Regular Visit        Encounter provider Kassie Vaughan PA-C    Provider located at [place] [address] [address] [place] [address]      Recent Visits  No visits were found meeting these conditions  Showing recent visits within past 7 days and meeting all other requirements     Today's Visits  Date Type Provider Dept   04/15/21 Telemedicine Kassie Vaughan PA-C [facility]   Showing today's visits and meeting all other requirements     Future Appointments  No visits were found meeting these conditions  Showing future appointments within next 150 days and meeting all other requirements        The patient was identified by name and date of birth  Berhane Piper was informed that this is a telemedicine visit and that the visit is being conducted through [employer] and patient was informed that this is not a secure, HIPAA-compliant platform  He agrees to proceed     Other methods to assure confidentiality were taken  No one else was in the room  He acknowledged consent and understanding of privacy and security of the video platform  The patient has agreed to participate and understands they can discontinue the visit at any time  Patient is aware this is a billable service  Cristina Samayoa is a 41 y o  male   C/o dry cough past 3 weeks, intermittent in the past    Once in a while has a wet cough, mucous in throat, past few days; sx  Getting better  Has hx  Of allergies  Has not tried any otc allergy meds  Past Medical History:   Diagnosis Date    Diverticula of colon     Hypertension     Pneumonia        Past Surgical History:   Procedure Laterality Date    GUM SURGERY      HERNIA REPAIR      PILONIDAL CYST EXCISION      WISDOM TOOTH EXTRACTION         Current Outpatient Medications   Medication Sig Dispense Refill    albuterol (PROVENTIL HFA,VENTOLIN HFA) 90 mcg/act inhaler Inhale 2 puffs every 6 (six) hours as needed for wheezing 1 Inhaler 5    valsartan (DIOVAN) 160 mg tablet Take 1 tablet (160 mg total) by mouth daily 90 tablet 3     No current facility-administered medications for this visit  No Known Allergies    Review of Systems   Constitutional: Positive for chills, diaphoresis, fatigue and fever  Feeling a little hot lately  HENT: Positive for congestion, postnasal drip and rhinorrhea  Negative for ear pain and sore throat  Throat felt dry, few weeks ago  Respiratory: Positive for cough, chest tightness and shortness of breath  Neurological: Positive for dizziness and light-headedness  Negative for headaches  Video Exam    Vitals:    04/15/21 0954   Weight: 136 kg (300 lb)       Physical Exam  Nursing note reviewed  Constitutional:       General: He is not in acute distress  Appearance: He is not ill-appearing, toxic-appearing or diaphoretic  HENT:      Head: Normocephalic and atraumatic  Pulmonary:      Effort: Pulmonary effort is normal    Neurological:      General: No focal deficit present  Mental Status: He is alert and oriented to person, place, and time  I spent 20 minutes directly with the patient during this visit      VIRTUAL VISIT Zakiya Piper acknowledges that he has consented to an online visit or consultation   He understands that the online visit is based solely on information provided by him, and that, in the absence of a face-to-face physical evaluation by the physician, the diagnosis he receives is both limited and provisional in terms of accuracy and completeness  This is not intended to replace a full medical face-to-face evaluation by the physician  Benjamin Piper understands and accepts these terms

## 2021-04-16 LAB — SARS-COV-2 RNA RESP QL NAA+PROBE: POSITIVE

## 2021-04-19 ENCOUNTER — TELEMEDICINE (OUTPATIENT)
Dept: FAMILY MEDICINE CLINIC | Facility: HOSPITAL | Age: 41
End: 2021-04-19
Payer: COMMERCIAL

## 2021-04-19 VITALS
HEIGHT: 73 IN | SYSTOLIC BLOOD PRESSURE: 154 MMHG | DIASTOLIC BLOOD PRESSURE: 93 MMHG | WEIGHT: 300 LBS | HEART RATE: 88 BPM | BODY MASS INDEX: 39.76 KG/M2

## 2021-04-19 DIAGNOSIS — E66.01 MORBID OBESITY (HCC): Chronic | ICD-10-CM

## 2021-04-19 DIAGNOSIS — U07.1 COVID-19 VIRUS INFECTION: Primary | ICD-10-CM

## 2021-04-19 DIAGNOSIS — J45.20 MILD INTERMITTENT ASTHMA WITHOUT COMPLICATION: ICD-10-CM

## 2021-04-19 DIAGNOSIS — U07.1 COVID-19: ICD-10-CM

## 2021-04-19 PROCEDURE — 3725F SCREEN DEPRESSION PERFORMED: CPT | Performed by: INTERNAL MEDICINE

## 2021-04-19 PROCEDURE — 99214 OFFICE O/P EST MOD 30 MIN: CPT | Performed by: INTERNAL MEDICINE

## 2021-04-19 RX ORDER — METHYLPREDNISOLONE 4 MG/1
TABLET ORAL
Qty: 21 EACH | Refills: 0 | Status: SHIPPED | OUTPATIENT
Start: 2021-04-19 | End: 2022-02-23 | Stop reason: ALTCHOICE

## 2021-04-19 NOTE — PROGRESS NOTES
COVID-19 Outpatient Progress Note    Assessment/Plan:    Problem List Items Addressed This Visit        Respiratory    Mild intermittent asthma without complication       Other    Morbid obesity (HCC) (Chronic)    COVID-19 virus infection - Primary    Relevant Medications    methylPREDNISolone 4 MG tablet therapy pack      Other Visit Diagnoses     COVID-19             Disposition:     I have spent 18 minutes directly with the patient  Greater than 50% of this time was spent in counseling/coordination of care regarding: prognosis, risks and benefits of treatment options and impressions  Oximeter to monitor oxygen level- call if less than 92%   take steroid dose pack    do   deep breathing exercises -try to stay in a prone position   consider for antibody infusion       Encounter provider Khang Pablo DO    Provider located at [place] [address] [place] [address]    Recent Visits  Date Type Provider Dept   04/15/21 [facility]medicine [facility]   Showing recent visits within past 7 days and meeting all other requirements     Today's Visits  Date Type Provider Dept   04/19/21 Telemedicine [facility]   Showing today's visits and meeting all other requirements     Future Appointments  No visits were found meeting these conditions  Showing future appointments within next 150 days and meeting all other requirements      This virtual check-in was done via [employer] and patient was informed that this is not a secure, HIPAA-compliant platform  He agrees to proceed  Patient agrees to participate in a virtual check in via telephone or video visit instead of presenting to the office to address urgent/immediate medical needs  Patient is aware this is a billable service      After connecting through [facility], the patient was identified by name and date of birth  Stephany Piper was informed that this was a telemedicine visit and that the exam was being conducted confidentially over secure lines  My office door was closed  No one else was in the room  Stephany Piper acknowledged consent and understanding of privacy and security of the telemedicine visit  I informed the patient that I have reviewed his record in Epic and presented the opportunity for him to ask any questions regarding the visit today  The patient agreed to participate  Subjective:   Jh Torres is a 41 y o  male who has been screened for COVID-19  Symptom change since last report: improving  Patient's symptoms include fatigue, nasal congestion, anosmia, shortness of breath, chest tightness and diarrhea  Patient denies fever  Stephany Moreno has been staying home and has isolated themselves in his home  He is taking care to not share personal items and is cleaning all surfaces that are touched often, like counters, tabletops, and doorknobs using household cleaning sprays or wipes  He is wearing a mask when he leaves his room  Date of symptom onset: 4/14/2021    Discussed monoclonal antibodies with pt- he declines  Having infusion- I explained risks versus benefits as well as emergency use authorization     he has had some flare of his asthma and is using his inhalers and does have some ongoing chest tightness -sometimes more on the right side than the left   coworkers have   had covid in past week- he has had ongoing cough and allergy issues for several weeks however on 04/14 he lost smell and developed fever and severe fatigue which is most likely the onset of symptoms-      Lab Results   Component Value Date    SARSCOV2 Positive (A) 04/15/2021    SARSCOV2 Not Detected 05/11/2020     Past Medical History:   Diagnosis Date    Diverticula of colon     Hypertension     Pneumonia      Past Surgical History:   Procedure Laterality Date    GUM SURGERY      HERNIA REPAIR      PILONIDAL CYST EXCISION      WISDOM TOOTH EXTRACTION       Current Outpatient Medications   Medication Sig Dispense Refill    albuterol (PROVENTIL HFA,VENTOLIN HFA) 90 mcg/act inhaler Inhale 2 puffs every 6 (six) hours as needed for wheezing 1 Inhaler 5    valsartan (DIOVAN) 160 mg tablet Take 1 tablet (160 mg total) by mouth daily 90 tablet 3    methylPREDNISolone 4 MG tablet therapy pack Use as directed on package 21 each 0     No current facility-administered medications for this visit  No Known Allergies    Review of Systems   Constitutional: Positive for fatigue  Negative for fever  HENT: Positive for congestion  Respiratory: Positive for chest tightness and shortness of breath  Gastrointestinal: Positive for diarrhea  Has some ongoing irritable bowel which is flared for approximately a month   Psychiatric/Behavioral: Negative  Objective:    Vitals:    04/19/21 1617   BP: 154/93   Pulse: 88   Weight: 136 kg (300 lb)   Height: 6' 1" (1 854 m)       Physical Exam  Vitals signs and nursing note reviewed  Constitutional:       Appearance: He is not toxic-appearing  Eyes:      General: No scleral icterus  Right eye: No discharge  Left eye: No discharge  Conjunctiva/sclera: Conjunctivae normal    Pulmonary:      Effort: No respiratory distress  Comments: Has some intermittent coughing -is not using accessory muscles of present  Abdominal:      General: Abdomen is flat  Palpations: Abdomen is soft  Tenderness: There is no abdominal tenderness  Skin:     Coloration: Skin is not pale  Findings: No rash  Neurological:      Mental Status: He is alert and oriented to person, place, and time  VIRTUAL VISIT DISCLAIMER    Lenin Piper acknowledges that he has consented to an online visit or consultation   He understands that the online visit is based solely on information provided by him, and that, in the absence of a face-to-face physical evaluation by the physician, the diagnosis he receives is both limited and provisional in terms of accuracy and completeness  This is not intended to replace a full medical face-to-face evaluation by the physician  Liana Piper understands and accepts these terms

## 2021-04-21 ENCOUNTER — TELEMEDICINE (OUTPATIENT)
Dept: FAMILY MEDICINE CLINIC | Facility: HOSPITAL | Age: 41
End: 2021-04-21
Payer: COMMERCIAL

## 2021-04-21 VITALS — WEIGHT: 300 LBS | HEIGHT: 73 IN | BODY MASS INDEX: 39.76 KG/M2

## 2021-04-21 DIAGNOSIS — U07.1 COVID-19 VIRUS INFECTION: Primary | ICD-10-CM

## 2021-04-21 PROCEDURE — 3008F BODY MASS INDEX DOCD: CPT | Performed by: INTERNAL MEDICINE

## 2021-04-21 PROCEDURE — 99213 OFFICE O/P EST LOW 20 MIN: CPT | Performed by: INTERNAL MEDICINE

## 2021-04-21 PROCEDURE — 1036F TOBACCO NON-USER: CPT | Performed by: INTERNAL MEDICINE

## 2021-04-21 NOTE — PATIENT INSTRUCTIONS
May return to work on April 25, 2021 if continued improvement as that will be greater than 10 days for isolation    He is not currently having fevers

## 2021-04-21 NOTE — PROGRESS NOTES
COVID-19 Outpatient Progress Note    Assessment/Plan:    Problem List Items Addressed This Visit        Other    COVID-19 virus infection - Primary         Disposition:     I have spent 13 minutes directly with the patient  Greater than 50% of this time was spent in counseling/coordination of care regarding: prognosis, risks and benefits of treatment options and instructions for management  Encounter provider Osmani Sneed DO    Provider located at [place] [address] [address] [place] [address]    Recent Visits  Date Type Provider Dept   04/19/21 Telemedicine [facility]   04/15/21 Telemedicine Dulce Maria Hannon PA-C [facility]   Showing recent visits within past 7 days and meeting all other requirements     Today's Visits  Date Type Provider Dept   04/21/21 Telemedicine [facility]   Showing today's visits and meeting all other requirements     Future Appointments  No visits were found meeting these conditions  Showing future appointments within next 150 days and meeting all other requirements      This virtual check-in was done via [employer] and patient was informed that this is not a secure, HIPAA-compliant platform  He agrees to proceed  Patient agrees to participate in a virtual check in via telephone or video visit instead of presenting to the office to address urgent/immediate medical needs  Patient is aware this is a billable service  After connecting through [facility], the patient was identified by name and date of birth  Nenita Piper was informed that this was a telemedicine visit and that the exam was being conducted confidentially over secure lines  My office door was closed  No one else was in the room  Nenita Piper acknowledged consent and understanding of privacy and security of the telemedicine visit   I informed the patient that I have reviewed his record in Epic and presented the opportunity for him to ask any questions regarding the visit today  The patient agreed to participate  Subjective:   Noy Garcia is a 41 y o  male who has been screened for COVID-19  Symptom change since last report: improving  Patient's symptoms include fatigue, anosmia and cough  Patient denies fever, chills, loss of taste, shortness of breath, nausea, diarrhea and headaches  Lenin Rai has been staying home and has isolated themselves in his home  He is taking care to not share personal items and is cleaning all surfaces that are touched often, like counters, tabletops, and doorknobs using household cleaning sprays or wipes  He is wearing a mask when he leaves his room  Date of symptom onset: 4/14/2021  Date of positive COVID-19 PCR: 4/15/2021    May return to work on 4/25-    Lab Results   Component Value Date    SARSCOV2 Positive (A) 04/15/2021    [address] [facility] Not Detected 05/11/2020     Past Medical History:   Diagnosis Date    COVID-19     Diverticula of colon     Hypertension     Pneumonia      Past Surgical History:   Procedure Laterality Date    GUM SURGERY      HERNIA REPAIR      PILONIDAL CYST EXCISION      WISDOM TOOTH EXTRACTION       Current Outpatient Medications   Medication Sig Dispense Refill    albuterol (PROVENTIL HFA,VENTOLIN HFA) 90 mcg/act inhaler Inhale 2 puffs every 6 (six) hours as needed for wheezing 1 Inhaler 5    methylPREDNISolone 4 MG tablet therapy pack Use as directed on package 21 each 0    valsartan (DIOVAN) 160 mg tablet Take 1 tablet (160 mg total) by mouth daily 90 tablet 3     No current facility-administered medications for this visit  No Known Allergies    Review of Systems   Constitutional: Positive for fatigue  Negative for chills and fever  Respiratory: Positive for cough  Negative for shortness of breath  Cardiovascular: Negative for chest pain     Gastrointestinal: Negative for diarrhea and nausea  Neurological: Negative for headaches  All other systems reviewed and are negative  Objective:    Vitals:    04/21/21 1309   Weight: 136 kg (300 lb)   Height: 6' 1" (1 854 m)       Physical Exam  Vitals signs and nursing note reviewed  Constitutional:       General: He is not in acute distress  Appearance: He is not toxic-appearing  HENT:      Nose: No congestion  Pulmonary:      Effort: Pulmonary effort is normal  No respiratory distress  Neurological:      General: No focal deficit present  Mental Status: He is oriented to person, place, and time  Cranial Nerves: No cranial nerve deficit  VIRTUAL VISIT DISCLAIMER    Adeola Piper acknowledges that he has consented to an online visit or consultation  He understands that the online visit is based solely on information provided by him, and that, in the absence of a face-to-face physical evaluation by the physician, the diagnosis he receives is both limited and provisional in terms of accuracy and completeness  This is not intended to replace a full medical face-to-face evaluation by the physician  Adeola Piper understands and accepts these terms

## 2021-04-21 NOTE — LETTER
April 21, 2021    Patient: Maria Luisa Piper  YOB: 1980  Date of Last Encounter: 4/19/2021      To whom it may concern:     Maria Luisa Piper has tested positive for COVID-19 (Coronavirus)  He may return to work on 4/25/21, which is 10 days from illness onset (provided symptoms are improving) and 24 hours without fever      Sincerely,         Opal Lewis, DO

## 2021-08-06 DIAGNOSIS — I10 BENIGN ESSENTIAL HYPERTENSION: ICD-10-CM

## 2021-08-06 RX ORDER — VALSARTAN 160 MG/1
160 TABLET ORAL DAILY
Qty: 90 TABLET | Refills: 3 | Status: SHIPPED | OUTPATIENT
Start: 2021-08-06 | End: 2021-12-12

## 2021-12-12 DIAGNOSIS — I10 BENIGN ESSENTIAL HYPERTENSION: ICD-10-CM

## 2021-12-12 RX ORDER — VALSARTAN 160 MG/1
TABLET ORAL
Qty: 30 TABLET | Refills: 0 | Status: SHIPPED | OUTPATIENT
Start: 2021-12-12 | End: 2022-01-10

## 2021-12-13 ENCOUNTER — TELEPHONE (OUTPATIENT)
Dept: FAMILY MEDICINE CLINIC | Facility: HOSPITAL | Age: 41
End: 2021-12-13

## 2022-01-10 DIAGNOSIS — I10 BENIGN ESSENTIAL HYPERTENSION: ICD-10-CM

## 2022-01-10 RX ORDER — VALSARTAN 160 MG/1
TABLET ORAL
Qty: 90 TABLET | Refills: 0 | Status: SHIPPED | OUTPATIENT
Start: 2022-01-10 | End: 2022-04-16

## 2022-02-23 ENCOUNTER — OFFICE VISIT (OUTPATIENT)
Dept: FAMILY MEDICINE CLINIC | Facility: HOSPITAL | Age: 42
End: 2022-02-23
Payer: COMMERCIAL

## 2022-02-23 VITALS
BODY MASS INDEX: 41.75 KG/M2 | WEIGHT: 315 LBS | DIASTOLIC BLOOD PRESSURE: 88 MMHG | HEIGHT: 73 IN | SYSTOLIC BLOOD PRESSURE: 138 MMHG | HEART RATE: 82 BPM | OXYGEN SATURATION: 97 %

## 2022-02-23 DIAGNOSIS — I10 BENIGN ESSENTIAL HYPERTENSION: Primary | ICD-10-CM

## 2022-02-23 DIAGNOSIS — E78.1 HYPERTRIGLYCERIDEMIA: ICD-10-CM

## 2022-02-23 DIAGNOSIS — E66.01 MORBID OBESITY (HCC): Chronic | ICD-10-CM

## 2022-02-23 DIAGNOSIS — D58.2 ELEVATED HEMOGLOBIN (HCC): ICD-10-CM

## 2022-02-23 DIAGNOSIS — E79.0 ELEVATED BLOOD URIC ACID LEVEL: ICD-10-CM

## 2022-02-23 DIAGNOSIS — J45.20 MILD INTERMITTENT ASTHMA WITHOUT COMPLICATION: ICD-10-CM

## 2022-02-23 PROBLEM — U07.1 COVID-19 VIRUS INFECTION: Status: RESOLVED | Noted: 2021-04-19 | Resolved: 2022-02-23

## 2022-02-23 PROCEDURE — 3725F SCREEN DEPRESSION PERFORMED: CPT | Performed by: INTERNAL MEDICINE

## 2022-02-23 PROCEDURE — 1036F TOBACCO NON-USER: CPT | Performed by: INTERNAL MEDICINE

## 2022-02-23 PROCEDURE — 3008F BODY MASS INDEX DOCD: CPT | Performed by: INTERNAL MEDICINE

## 2022-02-23 PROCEDURE — 99214 OFFICE O/P EST MOD 30 MIN: CPT | Performed by: INTERNAL MEDICINE

## 2022-02-23 NOTE — ASSESSMENT & PLAN NOTE
bp is showing improving control - diastolic is up today to 88- no headaches   discussed avoiding salt and lunchmeats

## 2022-02-23 NOTE — PROGRESS NOTES
Assessment/Plan:             Problem List Items Addressed This Visit        Respiratory    Mild intermittent asthma without complication     No  Flare of symptoms with weather changes- has prn albuterol            Cardiovascular and Mediastinum    Benign essential hypertension - Primary     bp is showing improving control - diastolic is up today to 88- no headaches   discussed avoiding salt and lunchmeats             Other    Morbid obesity ([facility] [address] ) (Chronic)     Discussed weight loss program- he wishes to return to gym         Hypertriglyceridemia     Had high level  On work related labs in October 2021-406         Elevated blood uric acid level     Unsure if there is a family hx of gout- no reported episodes of swollen hot joints   will repeat level         Elevated hemoglobin ([facility] [address] )     Snores at night- never had a sleep study                 Subjective:      Patient ID: Gretel Meckel is a 35 y o  male    Reviewed work labs from October     discussed having repeat labs done- Carol Zamudio   had covid April 2021 then had symptoms in December- he did not get tested- had mild symptoms with sore throat and irritated and runny nose- was unvaccinated at time- now had 2 shots -- had work exposures  With second shot had some right arm swellign and now unable to move 5th digit as well  The following portions of the patient's history were reviewed and updated as appropriate: allergies, current medications and problem list      Review of Systems   Constitutional: Negative for appetite change  Respiratory: Negative for shortness of breath  Wife reports he snores   Cardiovascular: Negative for chest pain  All other systems reviewed and are negative  BMI Counseling: Body mass index is 43 41 kg/m²  The BMI is above normal  Nutrition recommendations include 3-5 servings of fruits/vegetables daily      Objective:      Current Outpatient Medications:     valsartan (DIOVAN) 160 mg tablet, TAKE 1 TABLET BY MOUTH EVERY DAY, Disp: 90 tablet, Rfl: 0    albuterol (PROVENTIL HFA,VENTOLIN HFA) 90 mcg/act inhaler, Inhale 2 puffs every 6 (six) hours as needed for wheezing (Patient not taking: Reported on 2/23/2022 ), Disp: 1 Inhaler, Rfl: 5    Blood pressure 138/88, pulse 82, height 6' 1" (1 854 m), weight (!) 149 kg (329 lb), SpO2 97 %  Physical Exam  Vitals and nursing note reviewed  Constitutional:       General: He is not in acute distress  Appearance: He is obese  He is not toxic-appearing  HENT:      Right Ear: Tympanic membrane normal       Left Ear: Tympanic membrane normal       Nose: No congestion  Cardiovascular:      Rate and Rhythm: Normal rate and regular rhythm  Heart sounds: No murmur heard  Pulmonary:      Effort: Pulmonary effort is normal  No respiratory distress  Abdominal:      General: Abdomen is flat  There is no distension  Palpations: Abdomen is soft  Tenderness: There is no abdominal tenderness  Musculoskeletal:         General: No swelling or tenderness  Cervical back: Normal range of motion and neck supple  No tenderness  Comments: rigth 5th digiit with some swelling amnd uinable to touch thumb   Neurological:      General: No focal deficit present  Mental Status: He is oriented to person, place, and time  Cranial Nerves: No cranial nerve deficit  Psychiatric:         Mood and Affect: Mood normal          Thought Content:  Thought content normal          Judgment: Judgment normal

## 2022-02-23 NOTE — PATIENT INSTRUCTIONS
Do labs fasting today   if hand not improving- will refer to Dr Gloria Trivedi- hand specialist   Diet and exercise

## 2022-02-23 NOTE — ASSESSMENT & PLAN NOTE
Unsure if there is a family hx of gout- no reported episodes of swollen hot joints   will repeat level

## 2022-02-24 LAB
ALBUMIN SERPL-MCNC: 4.6 G/DL (ref 4–5)
ALBUMIN/GLOB SERPL: 1.8 {RATIO} (ref 1.2–2.2)
ALP SERPL-CCNC: 49 IU/L (ref 44–121)
ALT SERPL-CCNC: 26 IU/L (ref 0–44)
AST SERPL-CCNC: 21 IU/L (ref 0–40)
BASOPHILS # BLD AUTO: 0.1 X10E3/UL (ref 0–0.2)
BASOPHILS NFR BLD AUTO: 2 %
BILIRUB SERPL-MCNC: 1 MG/DL (ref 0–1.2)
BUN SERPL-MCNC: 14 MG/DL (ref 6–24)
BUN/CREAT SERPL: 11 (ref 9–20)
CALCIUM SERPL-MCNC: 9.7 MG/DL (ref 8.7–10.2)
CHLORIDE SERPL-SCNC: 102 MMOL/L (ref 96–106)
CHOLEST SERPL-MCNC: 220 MG/DL (ref 100–199)
CO2 SERPL-SCNC: 22 MMOL/L (ref 20–29)
CREAT SERPL-MCNC: 1.24 MG/DL (ref 0.76–1.27)
EOSINOPHIL # BLD AUTO: 0.3 X10E3/UL (ref 0–0.4)
EOSINOPHIL NFR BLD AUTO: 4 %
ERYTHROCYTE [DISTWIDTH] IN BLOOD BY AUTOMATED COUNT: 12.8 % (ref 11.6–15.4)
GLOBULIN SER-MCNC: 2.6 G/DL (ref 1.5–4.5)
GLUCOSE SERPL-MCNC: 95 MG/DL (ref 65–99)
HCT VFR BLD AUTO: 46.1 % (ref 37.5–51)
HDLC SERPL-MCNC: 29 MG/DL
HGB BLD-MCNC: 15.7 G/DL (ref 13–17.7)
IMM GRANULOCYTES # BLD: 0 X10E3/UL (ref 0–0.1)
IMM GRANULOCYTES NFR BLD: 0 %
LDLC SERPL CALC-MCNC: 114 MG/DL (ref 0–99)
LDLC/HDLC SERPL: 3.9 RATIO (ref 0–3.6)
LYMPHOCYTES # BLD AUTO: 3.1 X10E3/UL (ref 0.7–3.1)
LYMPHOCYTES NFR BLD AUTO: 33 %
MCH RBC QN AUTO: 30.4 PG (ref 26.6–33)
MCHC RBC AUTO-ENTMCNC: 34.1 G/DL (ref 31.5–35.7)
MCV RBC AUTO: 89 FL (ref 79–97)
MONOCYTES # BLD AUTO: 0.8 X10E3/UL (ref 0.1–0.9)
MONOCYTES NFR BLD AUTO: 9 %
NEUTROPHILS # BLD AUTO: 5 X10E3/UL (ref 1.4–7)
NEUTROPHILS NFR BLD AUTO: 52 %
PLATELET # BLD AUTO: 232 X10E3/UL (ref 150–450)
POTASSIUM SERPL-SCNC: 4.4 MMOL/L (ref 3.5–5.2)
PROT SERPL-MCNC: 7.2 G/DL (ref 6–8.5)
RBC # BLD AUTO: 5.17 X10E6/UL (ref 4.14–5.8)
SL AMB EGFR AFRICAN AMERICAN: 82 ML/MIN/1.73
SL AMB EGFR NON AFRICAN AMERICAN: 71 ML/MIN/1.73
SL AMB VLDL CHOLESTEROL CALC: 77 MG/DL (ref 5–40)
SODIUM SERPL-SCNC: 140 MMOL/L (ref 134–144)
TRIGL SERPL-MCNC: 438 MG/DL (ref 0–149)
URATE SERPL-MCNC: 8.1 MG/DL (ref 3.8–8.4)
WBC # BLD AUTO: 9.4 X10E3/UL (ref 3.4–10.8)

## 2022-03-19 DIAGNOSIS — E78.1 HYPERTRIGLYCERIDEMIA: ICD-10-CM

## 2022-03-21 RX ORDER — FENOFIBRATE 145 MG/1
TABLET, COATED ORAL
Qty: 90 TABLET | Refills: 4 | Status: SHIPPED | OUTPATIENT
Start: 2022-03-21

## 2022-04-16 DIAGNOSIS — I10 BENIGN ESSENTIAL HYPERTENSION: ICD-10-CM

## 2022-04-16 RX ORDER — VALSARTAN 160 MG/1
TABLET ORAL
Qty: 30 TABLET | Refills: 2 | Status: SHIPPED | OUTPATIENT
Start: 2022-04-16 | End: 2022-07-05

## 2022-07-05 DIAGNOSIS — I10 BENIGN ESSENTIAL HYPERTENSION: ICD-10-CM

## 2022-07-05 RX ORDER — VALSARTAN 160 MG/1
TABLET ORAL
Qty: 30 TABLET | Refills: 2 | Status: SHIPPED | OUTPATIENT
Start: 2022-07-05 | End: 2022-07-28

## 2022-07-28 DIAGNOSIS — I10 BENIGN ESSENTIAL HYPERTENSION: ICD-10-CM

## 2022-07-28 RX ORDER — VALSARTAN 160 MG/1
TABLET ORAL
Qty: 90 TABLET | Refills: 1 | Status: SHIPPED | OUTPATIENT
Start: 2022-07-28

## 2022-11-18 ENCOUNTER — HOSPITAL ENCOUNTER (OUTPATIENT)
Dept: RADIOLOGY | Facility: HOSPITAL | Age: 42
Discharge: HOME | End: 2022-11-18
Attending: INTERNAL MEDICINE

## 2022-11-18 ENCOUNTER — OFFICE VISIT (OUTPATIENT)
Dept: FAMILY MEDICINE CLINIC | Facility: HOSPITAL | Age: 42
End: 2022-11-18

## 2022-11-18 VITALS
SYSTOLIC BLOOD PRESSURE: 126 MMHG | OXYGEN SATURATION: 98 % | HEART RATE: 96 BPM | DIASTOLIC BLOOD PRESSURE: 80 MMHG | HEIGHT: 73 IN | BODY MASS INDEX: 41.75 KG/M2 | WEIGHT: 315 LBS

## 2022-11-18 DIAGNOSIS — U09.9 POST-COVID CHRONIC COUGH: ICD-10-CM

## 2022-11-18 DIAGNOSIS — R05.3 POST-COVID CHRONIC COUGH: Primary | ICD-10-CM

## 2022-11-18 DIAGNOSIS — G47.30 SLEEP DISORDER BREATHING: ICD-10-CM

## 2022-11-18 DIAGNOSIS — J45.20 MILD INTERMITTENT ASTHMA WITHOUT COMPLICATION: ICD-10-CM

## 2022-11-18 DIAGNOSIS — U09.9 POST-COVID CHRONIC COUGH: Primary | ICD-10-CM

## 2022-11-18 DIAGNOSIS — I10 BENIGN ESSENTIAL HYPERTENSION: ICD-10-CM

## 2022-11-18 DIAGNOSIS — R05.3 POST-COVID CHRONIC COUGH: ICD-10-CM

## 2022-11-18 DIAGNOSIS — E66.01 MORBID OBESITY (HCC): Chronic | ICD-10-CM

## 2022-11-18 DIAGNOSIS — L05.91 PILONIDAL CYST: ICD-10-CM

## 2022-11-18 PROBLEM — R51.9 FREQUENT HEADACHES: Status: RESOLVED | Noted: 2017-01-24 | Resolved: 2022-11-18

## 2022-11-18 RX ORDER — PREDNISONE 10 MG/1
TABLET ORAL
Qty: 18 TABLET | Refills: 0 | Status: SHIPPED | OUTPATIENT
Start: 2022-11-18

## 2022-11-18 RX ORDER — BUDESONIDE AND FORMOTEROL FUMARATE DIHYDRATE 160; 4.5 UG/1; UG/1
2 AEROSOL RESPIRATORY (INHALATION) 2 TIMES DAILY
Qty: 10.2 G | Refills: 3 | Status: SHIPPED | OUTPATIENT
Start: 2022-11-18

## 2022-11-18 RX ORDER — ALBUTEROL SULFATE 90 UG/1
2 AEROSOL, METERED RESPIRATORY (INHALATION) EVERY 6 HOURS PRN
Qty: 8 G | Refills: 5 | Status: SHIPPED | OUTPATIENT
Start: 2022-11-18

## 2022-11-18 NOTE — PROGRESS NOTES
Assessment/Plan:     Diagnosis ICD-10-CM Associated Orders   1  Post-COVID chronic cough  R05 3 XR chest pa & lateral    U09 9 predniSONE 10 mg tablet     budesonide-formoterol (Symbicort) 160-4 5 mcg/act inhaler      2  Mild intermittent asthma without complication  M35 32 budesonide-formoterol (Symbicort) 160-4 5 mcg/act inhaler     albuterol (PROVENTIL HFA,VENTOLIN HFA) 90 mcg/act inhaler          Problem List Items Addressed This Visit        Respiratory    Mild intermittent asthma without complication    Relevant Medications    budesonide-formoterol (Symbicort) 160-4 5 mcg/act inhaler    albuterol (PROVENTIL HFA,VENTOLIN HFA) 90 mcg/act inhaler   Other Visit Diagnoses     Post-COVID chronic cough    -  Primary    Relevant Medications    predniSONE 10 mg tablet    budesonide-formoterol (Symbicort) 160-4 5 mcg/act inhaler    Other Relevant Orders    XR chest pa & lateral            No follow-ups on file  Subjective:    Patient ID: Slime Piper is a 38 y o  male    1  Had covid in April 2021   feels he had it again in early October- had cough and sob- loss of  Taste and smell- now ongoing wheezing- did test 7 days later and was negative - 2 co workers had it   had  Immunizations x2 for covid- has not had booster      The following portions of the patient's history were reviewed and updated as appropriate: allergies, current medications and problem list      Review of Systems   Constitutional: Negative for fever  Respiratory: Positive for shortness of breath  Some sob with exercise- had wheezing persist after presumed covid in  Mid October   Cardiovascular: Negative for palpitations and leg swelling  All other systems reviewed and are negative          Objective:      Current Outpatient Medications:   •  albuterol (PROVENTIL HFA,VENTOLIN HFA) 90 mcg/act inhaler, Inhale 2 puffs every 6 (six) hours as needed for wheezing, Disp: 8 g, Rfl: 5  •  budesonide-formoterol (Symbicort) 160-4 5 mcg/act inhaler, Inhale 2 puffs 2 (two) times a day Rinse mouth after use , Disp: 10 2 g, Rfl: 3  •  fenofibrate (TRICOR) 145 mg tablet, TAKE 1 TABLET BY MOUTH EVERY DAY, Disp: 90 tablet, Rfl: 4  •  predniSONE 10 mg tablet, 30 mg by mouth daily for 3 days, then 20 mg by mouth daily for 3 days, then 10 mg by mouth daily for 3 days, then stop, Disp: 18 tablet, Rfl: 0  •  valsartan (DIOVAN) 160 mg tablet, TAKE 1 TABLET BY MOUTH EVERY DAY, Disp: 90 tablet, Rfl: 1    Blood pressure 126/80, pulse 96, height 6' 1" (1 854 m), weight (!) 349 kg (769 lb 6 5 oz), SpO2 98 %  Physical Exam  Vitals and nursing note reviewed  Constitutional:       Appearance: He is obese  HENT:      Head: Normocephalic and atraumatic  Right Ear: There is no impacted cerumen  Left Ear: There is no impacted cerumen  Nose: Congestion present  Mouth/Throat:      Pharynx: No posterior oropharyngeal erythema  Eyes:      General:         Right eye: No discharge  Left eye: No discharge  Extraocular Movements: Extraocular movements intact  Pupils: Pupils are equal, round, and reactive to light  Cardiovascular:      Rate and Rhythm: Normal rate and regular rhythm  Heart sounds: No murmur heard  No gallop  Pulmonary:      Breath sounds: Wheezing present  Comments: End exp wheezes  Abdominal:      Palpations: Abdomen is soft  Tenderness: There is no abdominal tenderness  Comments: obese   Musculoskeletal:      Right lower leg: No edema  Left lower leg: No edema  Lymphadenopathy:      Cervical: No cervical adenopathy  Neurological:      General: No focal deficit present  Mental Status: He is alert  Psychiatric:         Mood and Affect: Mood normal          Thought Content:  Thought content normal          Judgment: Judgment normal

## 2022-11-18 NOTE — ASSESSMENT & PLAN NOTE
Well controlled on diovan 160 mg daily  Discussed cafeeine intake- limiting- doing 2 liter of regular soda daily

## 2023-01-05 ENCOUNTER — OFFICE VISIT (OUTPATIENT)
Dept: PULMONOLOGY | Facility: HOSPITAL | Age: 43
End: 2023-01-05

## 2023-01-05 VITALS
WEIGHT: 315 LBS | DIASTOLIC BLOOD PRESSURE: 101 MMHG | HEART RATE: 90 BPM | SYSTOLIC BLOOD PRESSURE: 142 MMHG | OXYGEN SATURATION: 97 % | BODY MASS INDEX: 41.75 KG/M2 | HEIGHT: 73 IN

## 2023-01-05 DIAGNOSIS — G47.33 OSA (OBSTRUCTIVE SLEEP APNEA): Primary | ICD-10-CM

## 2023-01-05 DIAGNOSIS — E66.01 MORBID OBESITY (HCC): Chronic | ICD-10-CM

## 2023-01-05 DIAGNOSIS — G47.19 EXCESSIVE DAYTIME SLEEPINESS: ICD-10-CM

## 2023-01-05 DIAGNOSIS — G47.30 SLEEP DISORDER BREATHING: ICD-10-CM

## 2023-01-05 NOTE — ASSESSMENT & PLAN NOTE
Melissa Freeman has previously mobility for ROGER given his BMI 46 04, history of snoring, choking, gagging, gasping for air while he is asleep along with excessive daytime sleepiness  Ordered a home sleep study  I explained to the patient in details the pathophysiology of obstructive sleep apnea  I also explained the importance of treatment, and the consequences of untreated obstructive sleep apnea on underlying cardiovascular and cerebrovascular risk, morbidity, and mortality

## 2023-01-05 NOTE — PATIENT INSTRUCTIONS
Sleep Apnea   AMBULATORY CARE:   Sleep apnea  is a condition that causes you to stop breathing often during sleep  Types of sleep apnea:   Obstructive sleep apnea (ROGER)  is the most common kind  The muscles and tissues around your throat relax and block air from passing through  Obesity, use of alcohol or cigarettes, or a family history are common causes  ROGER may increase your risk for complications after surgery  Central sleep apnea (CSA)  means your brain does not send signals to the muscles that control breathing  You do not take a breath even though your airway is open  Common causes include medical conditions such as heart failure, being older than 40, or use of opioids  Complex (or mixed) sleep apnea  means you have both obstructive and central sleep apnea  Common signs and symptoms:   Loud snoring or long pauses in breathing    Feeling sleepy, slow, and tired during the day    Snorting, gasping, or choking while you sleep, and waking up suddenly because of these    Feeling irritable during the day    Dry mouth or a headache in the mornings    Heavy night sweating    A hard time thinking, remembering things, or focusing on your tasks the following day    Call your local emergency number (911 in the [address] [facility]) if:   You have chest pain or trouble breathing  Call your doctor if:   You have new or worsening signs or symptoms  You have questions or concerns about your condition or care  Treatment  depends on the kind of apnea you have  A mouth device  may be needed if you have mild sleep apnea  These are designed to keep your throat open  Ask your dentist or healthcare provider about the best mouth device for you  A machine  may be used to help you get more air during sleep  A mask may be placed over your nose and mouth, or just your nose  The mask is hooked to the machine  You will get air through the mask      A continuous positive airway pressure (CPAP) machine  is used to keep your airway open during sleep  The machine blows a gentle stream of air into the mask when you breathe  This helps keep your airway open so you can breathe more regularly  Extra oxygen may be given through the machine  A bilevel positive airway pressure (BiPAP) machine  gives air but lowers the pressure when you breathe out  An adaptive servo-ventilator (ASV)  is a machine that learns your usual breathing pattern  Then, it uses pressure to give you air and prevent stops in your breathing  Surgery  to expand your airway or remove extra tissues may be needed  Surgery is usually only considered if other treatments do not work  Manage or prevent sleep apnea:   Reach and maintain a healthy weight  Ask your healthcare provider what a healthy weight is for you  Ask him or her to help you create a safe weight loss plan if you are overweight  Even a small goal of a 10% weight loss can improve your symptoms  Do not smoke  Nicotine and other chemicals in cigarettes and cigars can cause lung damage  Ask your healthcare provider for information if you currently smoke and need help to quit  E-cigarettes or smokeless tobacco still contain nicotine  Talk to your healthcare provider before you use these products  Do not drink alcohol or take sedative medicine before you go to sleep  Alcohol and sedatives can relax the muscles and tissues around your throat  This can block the airflow to your lungs  Sleep on your side or use pillows designed to prevent sleep apnea  This prevents your tongue or other tissues from blocking your throat  You can also raise the head of your bed  Follow up with your doctor or specialist as directed: You may need to have blood tests during your follow-up visits  Work with your provider to find the right breathing support equipment and settings for you  Write down your questions so you remember to ask them during your visits    © Copyright [employer] 2022 Information is for End User's use only and may not be sold, redistributed or otherwise used for commercial purposes  All illustrations and images included in CareNotes® are the copyrighted property of A D A M , Inc  or Jim [employer]  The above information is an  only  It is not intended as medical advice for individual conditions or treatments  Talk to your doctor, nurse or pharmacist before following any medical regimen to see if it is safe and effective for you

## 2023-01-05 NOTE — PROGRESS NOTES
Sleep Consultation   Ari Piper 38 y o  male MRN: [id number]      Reason for consultation: ROGER    Requesting physician: Dr Sayra Khan     Assessment/Plan  1  ROGER (obstructive sleep apnea)  Assessment & Plan:  Naina Donovan has previously mobility for ROGER given his BMI 46 04, history of snoring, choking, gagging, gasping for air while he is asleep along with excessive daytime sleepiness  Ordered a home sleep study  I explained to the patient in details the pathophysiology of obstructive sleep apnea  I also explained the importance of treatment, and the consequences of untreated obstructive sleep apnea on underlying cardiovascular and cerebrovascular risk, morbidity, and mortality  Orders:  -     Home Study; Future    2  Sleep disorder breathing  -     Ambulatory Referral to Pulmonology  -     Home Study; Future    3  Excessive daytime sleepiness  -     Home Study; Future    4  Morbid obesity ([address] )  -     Home Study; Future          History of Present Illness   HPI:  Ari Piper is a 38 y o  male with PMHx as below who comes in for evaluation of obstructive sleep apnea  The patient works as a consultant  and he has changing shifts sometimes he works for his second or third shift which affects his sleep quality and makes it there for sleeping hours however regardless of the hours that he has been sleeping even if it is overnight hours he would wake up tired and sleepy during the day he has been witnessed to snore choke gag, and gasp for air while he is asleep he gets occasional headache, urinary frequency, acid reflux and poor short-term memory  Drinks 30 to 64 ounce of soda per day, denies tobacco alcohol or drug abuse and his [place] scale is 9 out of 24              Review of Systems      Genitourinary need to urinate more than twice a night   Cardiology none   Gastrointestinal none   Neurology frequent headaches, need to move extremities, forgetfulness and poor concentration or confusion, Constitutional fatigue, excessive sweating at night and weight change   Integumentary rash or dry skin and itching   Psychiatry aggressiveness or irritability   Musculoskeletal joint pain, muscle aches, back pain and leg cramps   Pulmonary wheezing, snoring and difficulty breathing when lying flat    ENT none   Endocrine excessive thirst and frequent urination   Hematological none               Historical Information   Past Medical History:   Diagnosis Date   • COVID-19    • Diverticula of colon    • Hypertension    • Pneumonia      Past Surgical History:   Procedure Laterality Date   • GUM SURGERY     • HERNIA REPAIR     • PILONIDAL CYST EXCISION     • WISDOM TOOTH EXTRACTION       Family History   Problem Relation Age of Onset   • Mental illness Mother    • Coronary artery disease Father    • Hypertension Father    • Cancer Maternal Grandmother         stomach   • Substance Abuse Neg Hx      Social History     Socioeconomic History   • Marital status: /Civil Union     Spouse name: Not on file   • Number of children: Not on file   • Years of education: Not on file   • Highest education level: Not on file   Occupational History   • Not on file   Tobacco Use   • Smoking status: Never   • Smokeless tobacco: Never   Vaping Use   • Vaping Use: Never used   Substance and Sexual Activity   • Alcohol use: Yes     Comment: once a year   • Drug use: No   • Sexual activity: Yes     Partners: Female   Other Topics Concern   • Not on file   Social History Narrative    Lives with wife    Feels safe at home    Occasional dental care    No living will      Social Determinants of Health     Financial Resource Strain: Not on file   Food Insecurity: Not on file   Transportation Needs: Not on file   Physical Activity: Not on file   Stress: Not on file   Social Connections: Not on file   Intimate Partner Violence: Not on file   Housing Stability: Not on file       Occupational History:     Meds/Allergies No Known Allergies    Home medications:  Prior to Admission medications    Medication Sig Start Date End Date Taking? Authorizing Provider   albuterol (PROVENTIL HFA,VENTOLIN HFA) 90 mcg/act inhaler Inhale 2 puffs every 6 (six) hours as needed for wheezing 11/18/22  Yes Precious Osorio, DO   budesonide-formoterol (Symbicort) 160-4 5 mcg/act inhaler Inhale 2 puffs 2 (two) times a day Rinse mouth after use  Patient taking differently: Inhale 2 puffs if needed Rinse mouth after use  11/18/22  Yes Precious Osorio, DO   fenofibrate (TRICOR) 145 mg tablet TAKE 1 TABLET BY MOUTH EVERY DAY 3/21/22  Yes Marilyn Santana PA-C   valsartan (DIOVAN) 160 mg tablet TAKE 1 TABLET BY MOUTH EVERY DAY 7/28/22  Yes Precious Osorio DO   predniSONE 10 mg tablet 30 mg by mouth daily for 3 days, then 20 mg by mouth daily for 3 days, then 10 mg by mouth daily for 3 days, then stop  Patient not taking: Reported on 1/5/2023 11/18/22   Khang Pablo DO       Vitals:   Blood pressure (!) 142/101, pulse 90, height 6' 1" (1 854 m), weight (!) 158 kg (349 lb), SpO2 97 %  ,  Body mass index is 46 04 kg/m²  Neck Circumference: 20 5    Physical Exam  General:  Awake alert and oriented x 3, conversant without conversational dyspnea, NAD, normal affect  HEENT:   Sclera noninjected, nonicteric OU, Nares patent,  no craniofacial abnormalities, Mucous membranes, moist, no oral lesions, normal dentition, Mallampati class 4  NECK:  Trachea midline, no accessory muscle use, no stridor,  JVP not elevated  CARDIAC: Reg, single s1/S2, no m/r/g  PULM: CTA bilaterally no wheezing, rhonchi or rales  ABD: Soft nontender, nondistended, no rebound, no rigidity, no guarding  EXT: No cyanosis, no clubbing, no edema, normal capillary refill  NEURO: no focal neurologic deficits, AAOx3, moving all extremities appropriately    Labs: I have personally reviewed pertinent lab results  , ABG: No results found for: PHART, WXG0LSC, PO2ART, ZBC0AGG, S2SZKJNH, BEART, SOURCE, BNP: No results found for: BNP, CBC: No results found for: WBC, HGB, HCT, MCV, PLT, ADJUSTEDWBC, MCH, MCHC, RDW, MPV, NRBC, CMP: No results found for: SODIUM, K, CL, CO2, ANIONGAP, BUN, CREATININE, GLUCOSE, CALCIUM, AST, ALT, ALKPHOS, PROT, BILITOT, EGFR, PT/INR: No results found for: PT, INR, Troponin: No results found for: TROPONINI  Lab Results   Component Value Date    WBC 9 4 02/23/2022    HGB 15 7 02/23/2022    HCT 46 1 02/23/2022    MCV 89 02/23/2022     02/23/2022      Lab Results   Component Value Date    GLUCOSE 92 01/26/2017    CALCIUM 9 7 01/26/2017     01/26/2017    K 4 4 02/23/2022    CO2 22 02/23/2022     02/23/2022    BUN 14 02/23/2022    CREATININE 1 24 02/23/2022     No results found for: IRON, TIBC, FERRITIN  No results found for: [id number]  No results found for: FOLATE      Sleep studies:  HST: ordered           MD [facility]       Portions of the record may have been created with voice recognition software  Occasional wrong word or "sound a like" substitutions may have occurred due to the inherent limitations of voice recognition software  Read the chart carefully and recognize, using context, where substitutions have occurred

## 2023-01-29 DIAGNOSIS — I10 BENIGN ESSENTIAL HYPERTENSION: ICD-10-CM

## 2023-01-29 RX ORDER — VALSARTAN 160 MG/1
TABLET ORAL
Qty: 30 TABLET | Refills: 5 | Status: SHIPPED | OUTPATIENT
Start: 2023-01-29

## 2023-01-31 ENCOUNTER — HOSPITAL ENCOUNTER (OUTPATIENT)
Dept: SLEEP CENTER | Facility: HOSPITAL | Age: 43
Discharge: HOME | End: 2023-01-31
Attending: INTERNAL MEDICINE

## 2023-01-31 DIAGNOSIS — G47.33 OSA (OBSTRUCTIVE SLEEP APNEA): ICD-10-CM

## 2023-01-31 DIAGNOSIS — E66.01 MORBID OBESITY (HCC): ICD-10-CM

## 2023-01-31 DIAGNOSIS — G47.19 EXCESSIVE DAYTIME SLEEPINESS: ICD-10-CM

## 2023-01-31 DIAGNOSIS — G47.30 SLEEP DISORDER BREATHING: ICD-10-CM

## 2023-02-02 DIAGNOSIS — G47.33 OSA (OBSTRUCTIVE SLEEP APNEA): Primary | ICD-10-CM

## 2023-02-02 NOTE — PROGRESS NOTES
Home Sleep Study Documentation    HOME STUDY DEVICE: Noxturnal no                                           Jocelynn G3 yes      Pre-Sleep Home Study:    Set-up and instructions performed by: MS    Technician performed demonstration for Patient: yes    Return demonstration performed by Patient: yes    Written instructions provided to Patient: yes    Patient signed consent form: yes        Post-Sleep Home Study:    Additional comments by Patient:         Home Sleep Study Failed:no:    Failure reason: N/A    Reported or Detected: N/A    Scored by: HALI Clement

## 2023-05-31 ENCOUNTER — HOSPITAL ENCOUNTER (OUTPATIENT)
Dept: SLEEP CENTER | Facility: CLINIC | Age: 43
Discharge: HOME | End: 2023-05-31

## 2023-05-31 DIAGNOSIS — G47.33 OSA (OBSTRUCTIVE SLEEP APNEA): ICD-10-CM

## 2023-06-01 NOTE — PROGRESS NOTES
Sleep Study Documentation    Pre-Sleep Study       Sleep testing procedure explained to patient:YES    Patient napped prior to study:NO    Caffeine:Dayshift worker after 12PM   Caffeine use:YES- soda  12 to 26 ounces and ice tea  12 ounces    Alcohol:Dayshift workers after 5PM: Alcohol use:NO    Typical day for patient:YES       Study Documentation    Sleep Study Indications: Severe obstructive sleep apnea demonstrated on home study performed 1/31/2023 with LAZARUS of [address]  Sleep Study: Treatment   Optimal PAP pressure: 24/17cm   Leak:None  Snore:Eliminated  REM Obtained:yes  Supplemental O2: no    Minimum SaO2 81%  Baseline SaO2 94%  PAP mask tried (list all) [employer] Sim[employer] (small)   PAP mask choice (final) [employer] Sim[employer] (small)   PAP mask type:full face  PAP pressure at which snoring was eliminated 24/17cm   Minimum SaO2 at final PAP pressure 90%  Mode of Therapy:CPAP  ETCO2:No  CPAP changed to BiPAP:Yes  If yes why Continous apneas and hypopneas         EKG abnormalities: no     EEG abnormalities: no    Sleep Study Recorded < 2 hours: N/A    Sleep Study Recorded > 2 hours but incomplete study: N/A    Sleep Study Recorded 6 hours but no sleep obtained: NO    Patient classification: employed       Post-Sleep Study    Medication used at bedtime or during sleep study:NO    Patient reports time it took to fall asleep:less than 20 minutes    Patient reports waking up during study:3 or more times  Patient reports returning to sleep in 10 to 30 minutes  Patient reports sleeping 2 to 4 hours without dreaming  Patient reports sleep during study:better than usual    Patient rated sleepiness: Somewhat sleepy or tired    PAP treatment:yes: Post PAP treatment patient reports feeling unsure if a change is noted and  would wear PAP mask at home

## 2023-06-05 DIAGNOSIS — G47.30 SLEEP DISORDER BREATHING: Primary | ICD-10-CM

## 2023-06-05 DIAGNOSIS — G47.33 OSA (OBSTRUCTIVE SLEEP APNEA): ICD-10-CM

## 2023-06-07 ENCOUNTER — TELEPHONE (OUTPATIENT)
Dept: SLEEP CENTER | Facility: CLINIC | Age: 43
End: 2023-06-07

## 2023-06-07 NOTE — TELEPHONE ENCOUNTER
Patient of Dr Natividad Tompkins in the [address] [place] [address] pulmonary office  Left message for patient to call office to review sleep study results  CPAP study resulted and BiPAP ordered

## 2023-06-08 NOTE — TELEPHONE ENCOUNTER
Returned call from patient and advised of order for BiPAP  Patient agreeable to use [employer] for DME provider  Advised I will send message to pulmonary office to process order  Someone from [address] should then reach out to arrange set up of machine  Also advised that insurance will require follow up with the physician 31-90 days after starting use of machine  I will send message to pulmonary office to reach out to schedule appointment  Patient agreeable

## 2023-06-09 NOTE — PROGRESS NOTES
Placed order through [facility] for a BIPAP machine through [employer]  Pt is aware

## 2023-06-15 LAB

## 2023-06-20 LAB
DME PARACHUTE DELIVERY DATE ACTUAL: NORMAL
DME PARACHUTE DELIVERY DATE EXPECTED: NORMAL
DME PARACHUTE DELIVERY DATE REQUESTED: NORMAL
DME PARACHUTE ITEM DESCRIPTION: NORMAL
DME PARACHUTE ORDER STATUS: NORMAL
DME PARACHUTE SUPPLIER NAME: NORMAL
DME PARACHUTE SUPPLIER PHONE: NORMAL

## 2023-08-01 ENCOUNTER — OFFICE VISIT (OUTPATIENT)
Age: 43
End: 2023-08-01
Payer: COMMERCIAL

## 2023-08-01 VITALS
DIASTOLIC BLOOD PRESSURE: 98 MMHG | HEIGHT: 73 IN | SYSTOLIC BLOOD PRESSURE: 140 MMHG | OXYGEN SATURATION: 98 % | WEIGHT: 315 LBS | HEART RATE: 97 BPM | BODY MASS INDEX: 41.75 KG/M2

## 2023-08-01 DIAGNOSIS — G47.30 SLEEP DISORDER BREATHING: ICD-10-CM

## 2023-08-01 DIAGNOSIS — J45.20 MILD INTERMITTENT ASTHMA WITHOUT COMPLICATION: ICD-10-CM

## 2023-08-01 DIAGNOSIS — G47.33 OSA (OBSTRUCTIVE SLEEP APNEA): Primary | ICD-10-CM

## 2023-08-01 DIAGNOSIS — G47.19 EXCESSIVE DAYTIME SLEEPINESS: ICD-10-CM

## 2023-08-01 DIAGNOSIS — E66.01 MORBID OBESITY (HCC): Chronic | ICD-10-CM

## 2023-08-01 PROCEDURE — 99214 OFFICE O/P EST MOD 30 MIN: CPT | Performed by: INTERNAL MEDICINE

## 2023-08-01 NOTE — PATIENT INSTRUCTIONS
BiPAP   AMBULATORY CARE:   Bilevel positive airway pressure (BiPAP)  is a treatment that uses mild air pressure to keep your airways open while you sleep. BiPAP is used to treat obstructive sleep apnea (ROGER) in people who cannot tolerate CPAP treatment. It is also used in people with obesity hypoventilation syndrome, central apnea, or restrictive or obstructive lung problems. Difference between BiPAP and CPAP:  The BiPAP machine delivers a higher amount of air pressure when you breathe in than when you breathe out. CPAP delivers a constant level of air pressure during treatment. In both, the mask connects to the machine with a hose. Air pressure is delivered to the mask through the hose. Benefits of BiPAP:   Improves quality of sleep     Relieves daytime sleepiness     Improves memory    Reduces the risk of heart disease    Improves your mood and quality of life    Make BiPAP easier to use: At first, try to use your BiPAP for a few hours every night. Then slowly increase the length of time you use your machine. It takes time to adjust to BiPAP treatment. You may need a mask that is a different size, shape, or material.  Talk to your healthcare provider if your mask feels uncomfortable or irritates your skin. You may need to use a special moisturizer made for users. Use a saline nasal spray at bedtime to help relieve nasal irritation. A chin strap to help keep your mouth closed or a different type of mask can help dry mouth. Some machines come with a heated humidifier to help relieve these symptoms. Talk to your healthcare provider if you are having problems adjusting to the air pressure. He or she can tell you how to adjust the air pressure on your BiPAP. You may need to start at a lower pressure and slowly increase it over time.     Call your healthcare provider for any of the following:   Continued sleepiness during the day, even after wearing your BiPAP device as directed    Continued problems caused by BiPAP that do not improve    Questions or concerns about your condition, care, or equipment. Follow up with your healthcare provider as directed:  Tell your healthcare provider if your mask no longer fits properly. Write down your questions so you remember to ask them during your visits. © Copyright [facility] 2022 Information is for End User's use only and may not be sold, redistributed or otherwise used for commercial purposes. The above information is an  only. It is not intended as medical advice for individual conditions or treatments. Talk to your doctor, nurse or pharmacist before following any medical regimen to see if it is safe and effective for you.

## 2023-08-01 NOTE — ASSESSMENT & PLAN NOTE
Recent reestablishment of severe ROGER with AHI of 89.7 events per hour  Required titration study set up on auto BiPAP minimal EPAP of 15 pressure support of 6, reviewed his compliance today he is 95th percentile EPAP pressure of 16.6 minimal residual AHI of 1.9  He has intermittent high mask leaks probably due to old cushion that he has not replaced since he got the machine which I instructed him to do so, if it does not help, will provide him the address for the [facility] office to walk in there for mask fitting  I reminded him in details the pathophysiology of obstructive sleep apnea. I also explained the importance of treatment, and the consequences of untreated obstructive sleep apnea on underlying cardiovascular and cerebrovascular risk, morbidity, and mortality.     He has been feeling that the ramp is suffocating, I lowered the ramp to 5 minutes, increased to 10 cm water

## 2023-08-01 NOTE — PROGRESS NOTES
Pulmonary/Sleep Follow Up Note   Dorcas Piper 38 y.o. male MRN: [id number]  8/1/2023      Assessment and Plan:    1. ROGER (obstructive sleep apnea)  Assessment & Plan:  Recent reestablishment of severe ROGER with AHI of 89.7 events per hour  Required titration study set up on auto BiPAP minimal EPAP of 15 pressure support of 6, reviewed his compliance today he is 95th percentile EPAP pressure of 16.6 minimal residual AHI of 1.9  He has intermittent high mask leaks probably due to old cushion that he has not replaced since he got the machine which I instructed him to do so, if it does not help, will provide him the address for the [facility] office to walk in there for mask fitting  I reminded him in details the pathophysiology of obstructive sleep apnea. I also explained the importance of treatment, and the consequences of untreated obstructive sleep apnea on underlying cardiovascular and cerebrovascular risk, morbidity, and mortality. He has been feeling that the ramp is suffocating, I lowered the ramp to 5 minutes, increased to 10 cm water     Orders:  -     PAP DME Resupply/Reorder    2. Mild intermittent asthma without complication  Assessment & Plan:  His asthma has been well controlled has not been using inhalers      3. Excessive daytime sleepiness  Assessment & Plan:  Improved with CPAP      4. Sleep disorder breathing    5. Morbid obesity ([address])  Assessment & Plan: Morbid obesity with BMI 48.02          Return in about 1 year (around 8/1/2024). History of Present Illness   HPI:  Dorcas Piper is a 38 y.o. male who is here for compliance visit he was seen in January she was diagnosed with history of severe ROGER, he underwent in lab diagnostic sleep study found to have severe ROGER underwent a titration study started on auto BiPAP with significant improvement of his symptoms.   Sitting and reading: Slight chance of dozing  Watching TV: Moderate chance of dozing  Sitting, inactive in a public place (e.g. a theatre or a meeting): Slight chance of dozing  As a passenger in a car for an hour without a break: Would never doze  Lying down to rest in the afternoon when circumstances permit: Slight chance of dozing  Sitting and talking to someone: Slight chance of dozing  Sitting quietly after a lunch without alcohol: Slight chance of dozing  In a car, while stopped for a few minutes in traffic: Would never doze  Total score: 7        Historical Information   Past Medical History:   Diagnosis Date   • COVID-19    • Diverticula of colon    • Hypertension    • Pneumonia      Past Surgical History:   Procedure Laterality Date   • GUM SURGERY     • HERNIA REPAIR     • PILONIDAL CYST EXCISION     • WISDOM TOOTH EXTRACTION       Family History   Problem Relation Age of Onset   • Mental illness Mother    • Coronary artery disease Father    • Hypertension Father    • Cancer Maternal Grandmother         stomach   • Substance Abuse Neg Hx          Meds/Allergies     Current Outpatient Medications:   •  albuterol (PROVENTIL HFA,VENTOLIN HFA) 90 mcg/act inhaler, Inhale 2 puffs every 6 (six) hours as needed for wheezing, Disp: 8 g, Rfl: 5  •  Symbicort 160-4.5 MCG/ACT inhaler, INHALE 2 PUFFS BY MOUTH 2 TIMES A DAY RINSE MOUTH AFTER USE., Disp: 10.2 g, Rfl: 3  •  valsartan (DIOVAN) 160 mg tablet, TAKE 1 TABLET BY MOUTH EVERY DAY, Disp: 90 tablet, Rfl: 3  •  fenofibrate (TRICOR) 145 mg tablet, TAKE 1 TABLET BY MOUTH EVERY DAY, Disp: 90 tablet, Rfl: 3  •  predniSONE 10 mg tablet, 30 mg by mouth daily for 3 days, then 20 mg by mouth daily for 3 days, then 10 mg by mouth daily for 3 days, then stop (Patient not taking: Reported on 1/5/2023), Disp: 18 tablet, Rfl: 0  No Known Allergies    Vitals: Blood pressure 140/98, pulse 97, height 6' 1" (1.854 m), weight (!) 165 kg (364 lb), SpO2 98 %. Body mass index is 48.02 kg/m².  Oxygen Therapy  SpO2: 98 %  Oxygen Therapy: None (Room air)      Physical Exam  General:  Awake alert and oriented x 3, conversant without conversational dyspnea, NAD, normal affect  HEENT:   Sclera noninjected, nonicteric OU, Nares patent,  no craniofacial abnormalities  NECK:  Trachea midline, no accessory muscle use, no stridor,  JVP not elevated  CARDIAC: Reg, single s1/S2, no m/r/g  PULM: CTA bilaterally no wheezing, rhonchi or rales  ABD: Soft nontender, nondistended, no rebound, no rigidity, no guarding  EXT: No cyanosis, no clubbing, no edema, normal capillary refill  NEURO: no focal neurologic deficits, AAOx3, moving all extremities appropriately    Labs: I have personally reviewed pertinent lab results. , ABG: No results found for: "PHART", "JRU8YEY", "PO2ART", "DPU8WJC", "X3VQQMND", "BEART", "SOURCE", BNP: No results found for: "BNP", CBC: No results found for: "WBC", "HGB", "HCT", "MCV", "PLT", "ADJUSTEDWBC", "RBC", "MCH", "MCHC", "RDW", "MPV", "NRBC", CMP: No results found for: "SODIUM", "K", "CL", "CO2", "ANIONGAP", "BUN", "CREATININE", "GLUCOSE", "CALCIUM", "AST", "ALT", "ALKPHOS", "PROT", "BILITOT", "EGFR", PT/INR: No results found for: "PT", "INR", Troponin: No results found for: "TROPONINI"  Lab Results   Component Value Date    WBC 9.4 02/23/2022    HGB 15.7 02/23/2022    HCT 46.1 02/23/2022    MCV 89 02/23/2022     02/23/2022     Lab Results   Component Value Date    GLUCOSE 92 01/26/2017    CALCIUM 9.7 01/26/2017     01/26/2017    K 4.4 02/23/2022    CO2 22 02/23/2022     02/23/2022    BUN 14 02/23/2022    CREATININE 1.24 02/23/2022     No results found for: "IGE"  Lab Results   Component Value Date    ALT 26 02/23/2022    AST 21 02/23/2022    ALKPHOS 50 01/22/2017           Sleep studies: I have personally reviewed pertinent reports. AHI 89.7 events/hr --> severe ROGER    Compliance report:I have personally reviewed pertinent reports.       Type of CPAP:  Auto BiPAP 15 +6                                   Percent usage: 97%                                   Average time used: 4 hrs 32 min                                  Time in large leak: intermittently borderline                                   Residual AHI: 1.9            Adan Barnhart MD  [facility]       Portions of the record may have been created with voice recognition software. Occasional wrong word or "sound a like" substitutions may have occurred due to the inherent limitations of voice recognition software. Read the chart carefully and recognize, using context, where substitutions have occurred.

## 2023-08-04 LAB

## 2024-03-03 DIAGNOSIS — I10 BENIGN ESSENTIAL HYPERTENSION: ICD-10-CM

## 2024-03-04 RX ORDER — VALSARTAN 160 MG/1
TABLET ORAL
Qty: 30 TABLET | Refills: 0 | Status: SHIPPED | OUTPATIENT
Start: 2024-03-04

## 2024-03-04 NOTE — TELEPHONE ENCOUNTER
Spoke with patient - he said he did not have his schedule with him - he will call back in a few hours to schedule this appt.

## 2024-03-31 DIAGNOSIS — I10 BENIGN ESSENTIAL HYPERTENSION: ICD-10-CM

## 2024-04-01 RX ORDER — VALSARTAN 160 MG/1
TABLET ORAL
Qty: 30 TABLET | Refills: 5 | Status: SHIPPED | OUTPATIENT
Start: 2024-04-01

## 2024-05-03 ENCOUNTER — OFFICE VISIT (OUTPATIENT)
Dept: FAMILY MEDICINE CLINIC | Facility: HOSPITAL | Age: 44
End: 2024-05-03
Payer: COMMERCIAL

## 2024-05-03 VITALS
SYSTOLIC BLOOD PRESSURE: 136 MMHG | DIASTOLIC BLOOD PRESSURE: 80 MMHG | WEIGHT: 315 LBS | HEIGHT: 73 IN | BODY MASS INDEX: 41.75 KG/M2

## 2024-05-03 DIAGNOSIS — G47.33 OSA (OBSTRUCTIVE SLEEP APNEA): ICD-10-CM

## 2024-05-03 DIAGNOSIS — E78.1 HYPERTRIGLYCERIDEMIA: ICD-10-CM

## 2024-05-03 DIAGNOSIS — E79.0 ELEVATED BLOOD URIC ACID LEVEL: ICD-10-CM

## 2024-05-03 DIAGNOSIS — J45.20 MILD INTERMITTENT ASTHMA WITHOUT COMPLICATION: ICD-10-CM

## 2024-05-03 DIAGNOSIS — E66.01 MORBID OBESITY (HCC): Chronic | ICD-10-CM

## 2024-05-03 DIAGNOSIS — Z00.00 ANNUAL PHYSICAL EXAM: Primary | ICD-10-CM

## 2024-05-03 DIAGNOSIS — M54.6 THORACOLUMBAR BACK PAIN: ICD-10-CM

## 2024-05-03 DIAGNOSIS — M54.50 THORACOLUMBAR BACK PAIN: ICD-10-CM

## 2024-05-03 PROCEDURE — 3725F SCREEN DEPRESSION PERFORMED: CPT | Performed by: INTERNAL MEDICINE

## 2024-05-03 PROCEDURE — 99396 PREV VISIT EST AGE 40-64: CPT | Performed by: INTERNAL MEDICINE

## 2024-05-03 RX ORDER — MELOXICAM 15 MG/1
15 TABLET ORAL DAILY PRN
Qty: 30 TABLET | Refills: 3 | Status: SHIPPED | OUTPATIENT
Start: 2024-05-03

## 2024-05-03 RX ORDER — METHOCARBAMOL 750 MG/1
750 TABLET, FILM COATED ORAL
Qty: 30 TABLET | Refills: 1 | Status: SHIPPED | OUTPATIENT
Start: 2024-05-03 | End: 2024-07-02

## 2024-05-03 NOTE — PROGRESS NOTES
ADULT ANNUAL PHYSICAL  [facility] [address]    NAME: Justin Piper  AGE: 43 y.o. SEX: male  : 1980     DATE: 5/3/2024     Assessment and Plan:     Problem List Items Addressed This Visit        Respiratory    Mild intermittent asthma without complication    ROGER (obstructive sleep apnea)     Bipap at night - her reports he needs  high setting             Other    Morbid obesity (HCC) (Chronic)     Sits at a computer during day         Relevant Orders    Ambulatory Referral to Comprehensive Spine PT    TSH, 3rd generation with Free T4 reflex    Hypertriglyceridemia    Relevant Orders    Comprehensive metabolic panel    Lipid Panel with Direct LDL reflex    Elevated blood uric acid level    Relevant Orders    Uric acid   Other Visit Diagnoses     Annual physical exam    -  Primary    Thoracolumbar back pain        Relevant Medications    methocarbamol (ROBAXIN) 750 mg tablet    meloxicam (MOBIC) 15 mg tablet    Other Relevant Orders    Ambulatory Referral to Comprehensive Spine PT    CBC and differential          Immunizations and preventive care screenings were discussed with patient today. Appropriate education was printed on patient's after visit summary.        Counseling:  Exercise: the importance of regular exercise/physical activity was discussed. Recommend exercise 3-5 times per week for at least 30 minutes.          Return in about 6 months (around 11/3/2024) for Recheck.     Chief Complaint:     Chief Complaint   Patient presents with   • Physical Exam     PT is having back issues       History of Present Illness:     Adult Annual Physical   Patient here for a comprehensive physical exam. The patient reports problems - has thoracic and lumbar spasms when walking- has uses prn tylenol .    Diet and Physical Activity  Diet/Nutrition: low carb diet.   Exercise: 3-4 times a week on average.      Depression Screening  PHQ-2/9 Depression  Screening    Little interest or pleasure in doing things: 0 - not at all  Feeling down, depressed, or hopeless: 0 - not at all  PHQ-2 Score: 0  PHQ-2 Interpretation: Negative depression screen       General Health  Sleep:  is on bipap .   Hearing: normal - bilateral.  Vision: most recent eye exam <1 year ago.   Dental: regular dental visits.        Health  Symptoms include: none    Advanced Care Planning  Do you have an advanced directive? no  Do you have a durable medical power of ? no  ACP document given to patient? yes     Review of Systems:     Review of Systems   Past Medical History:     Past Medical History:   Diagnosis Date   • COVID-19    • Diverticula of colon    • Hypertension    • Pneumonia       Past Surgical History:     Past Surgical History:   Procedure Laterality Date   • GUM SURGERY     • HERNIA REPAIR     • PILONIDAL CYST EXCISION     • WISDOM TOOTH EXTRACTION        Family History:     Family History   Problem Relation Age of Onset   • Mental illness Mother    • Coronary artery disease Father    • Hypertension Father    • Cancer Maternal Grandmother         stomach   • Substance Abuse Neg Hx       Social History:     Social History     Socioeconomic History   • Marital status: /Civil Union     Spouse name: None   • Number of children: None   • Years of education: None   • Highest education level: None   Occupational History   • None   Tobacco Use   • Smoking status: Never   • Smokeless tobacco: Never   Vaping Use   • Vaping status: Never Used   Substance and Sexual Activity   • Alcohol use: Yes     Comment: once a year   • Drug use: No   • Sexual activity: Yes     Partners: Female   Other Topics Concern   • None   Social History Narrative    Lives with wife    Feels safe at home    Occasional dental care    No living will      Social Determinants of Health     Financial Resource Strain: Low Risk  (4/21/2021)    Overall Financial Resource Strain (CARDIA)    • Difficulty of Paying  Living Expenses: Not hard at all   Food Insecurity: No Food Insecurity (4/21/2021)    Hunger Vital Sign    • Worried About Running Out of Food in the Last Year: Never true    • Ran Out of Food in the Last Year: Never true   Transportation Needs: No Transportation Needs (4/21/2021)    PRAPARE - Transportation    • Lack of Transportation (Medical): No    • Lack of Transportation (Non-Medical): No   Physical Activity: Inactive (4/21/2021)    Exercise Vital Sign    • Days of Exercise per Week: 0 days    • Minutes of Exercise per Session: 0 min   Stress: Stress Concern Present (4/21/2021)    [employer] - Occupational Stress Questionnaire    • Feeling of Stress : To some extent   Social Connections: Not on file   Intimate Partner Violence: Not At Risk (4/21/2021)    Humiliation, Afraid, Rape, and Kick questionnaire    • Fear of Current or Ex-Partner: No    • Emotionally Abused: No    • Physically Abused: No    • Sexually Abused: No   Housing Stability: Not on file      Current Medications:     Current Outpatient Medications   Medication Sig Dispense Refill   • albuterol (PROVENTIL HFA,VENTOLIN HFA) 90 mcg/act inhaler Inhale 2 puffs every 6 (six) hours as needed for wheezing 8 g 5   • meloxicam (MOBIC) 15 mg tablet Take 1 tablet (15 mg total) by mouth daily as needed for moderate pain 30 tablet 3   • methocarbamol (ROBAXIN) 750 mg tablet Take 1 tablet (750 mg total) by mouth daily at bedtime 30 tablet 1   • Symbicort 160-4.5 MCG/ACT inhaler INHALE 2 PUFFS BY MOUTH 2 TIMES A DAY RINSE MOUTH AFTER USE. (Patient taking differently: 2 puffs 2 (two) times a day as needed) 10.2 g 3   • valsartan (DIOVAN) 160 mg tablet TAKE 1 TABLET BY MOUTH EVERY DAY 30 tablet 5   • fenofibrate (TRICOR) 145 mg tablet TAKE 1 TABLET BY MOUTH EVERY DAY 90 tablet 3   • predniSONE 10 mg tablet 30 mg by mouth daily for 3 days, then 20 mg by mouth daily for 3 days, then 10 mg by mouth daily for 3 days, then stop (Patient  "not taking: Reported on 1/5/2023) 18 tablet 0     No current facility-administered medications for this visit.      Allergies:     No Known Allergies   Physical Exam:     /80   Ht 6' 1\" (1.854 m)   Wt (!) 171 kg (376 lb)   BMI 49.61 kg/m²     Physical Exam  Vitals reviewed.   Constitutional:       General: He is not in acute distress.     Appearance: He is obese.   HENT:      Head: Normocephalic.      Right Ear: Tympanic membrane normal. There is no impacted cerumen.      Left Ear: Tympanic membrane normal. There is no impacted cerumen.      Nose: No congestion.      Mouth/Throat:      Pharynx: No posterior oropharyngeal erythema.   Eyes:      General:         Right eye: No discharge.         Left eye: No discharge.   Cardiovascular:      Rate and Rhythm: Normal rate and regular rhythm.      Heart sounds: No murmur heard.  Pulmonary:      Effort: No respiratory distress.      Breath sounds: Normal breath sounds. No stridor. No rhonchi.   Abdominal:      General: There is no distension.      Palpations: Abdomen is soft.      Tenderness: There is no abdominal tenderness.   Musculoskeletal:         General: Tenderness present.      Cervical back: No rigidity or tenderness.      Comments: Lower lumbar flattening and spasticity   Neurological:      Mental Status: He is alert and oriented to person, place, and time.      Motor: No weakness.   Psychiatric:         Mood and Affect: Mood normal.         Thought Content: Thought content normal.         Judgment: Judgment normal.          Precious Osorio DO  [facility] [address]    "

## 2024-05-08 ENCOUNTER — TELEPHONE (OUTPATIENT)
Age: 44
End: 2024-05-08

## 2024-05-08 NOTE — TELEPHONE ENCOUNTER
Patient is requesting an insurance referral for the following specialty:      Test Name / Order Name:  Already in system    DX Code: M54.50    Date Of Service: 5/16/24    Location/Facility Name/Address/Phone #:   [address]   [phone]     Location / Facility NPI: [id number]    Best Phone # To Reach The Patient:  [phone]     Cpt code 94279    Already has the order, this is just the insurance requirement per Physical Therapy Office.

## 2024-05-23 ENCOUNTER — EVALUATION (OUTPATIENT)
Dept: PHYSICAL THERAPY | Facility: CLINIC | Age: 44
End: 2024-05-23
Payer: COMMERCIAL

## 2024-05-23 DIAGNOSIS — M54.50 THORACOLUMBAR BACK PAIN: ICD-10-CM

## 2024-05-23 DIAGNOSIS — M54.6 THORACOLUMBAR BACK PAIN: ICD-10-CM

## 2024-05-23 DIAGNOSIS — E66.01 MORBID OBESITY (HCC): Chronic | ICD-10-CM

## 2024-05-23 PROCEDURE — 97530 THERAPEUTIC ACTIVITIES: CPT | Performed by: PHYSICAL THERAPIST

## 2024-05-23 PROCEDURE — 97110 THERAPEUTIC EXERCISES: CPT | Performed by: PHYSICAL THERAPIST

## 2024-05-23 PROCEDURE — 97161 PT EVAL LOW COMPLEX 20 MIN: CPT | Performed by: PHYSICAL THERAPIST

## 2024-05-23 NOTE — PROGRESS NOTES
PT Evaluation     Today's date: 2024  Patient name: Justin Piper  : 1980  MRN: [id number]  Referring provider: Precious Osorio DO  Dx:   Encounter Diagnosis     ICD-10-CM    1. Thoracolumbar back pain  M54.50 Ambulatory Referral to Comprehensive Spine PT    M54.6       2. Morbid obesity (HCC)  E66.01 Ambulatory Referral to Comprehensive Spine PT                     Assessment  Impairments: lacks appropriate home exercise program, pain with function and weight-bearing intolerance  Symptom irritability: moderate    Assessment details: Justin Piper is a 43 y.o. male presenting to outpatient physical [facility] at [facility] with complaints of thoracic pain, stiffness and weakness.  He presents with decreased range of motion, decreased strength, limited flexibility, poor postural awareness, poor body mechanics, decreased tolerance to activity and decreased functional mobility due to Thoracolumbar back pain  Morbid obesity (HCC).  He would benefit from skilled PT services in order to address these deficits and reach maximum level of function.  Thank you for the referral!    Understanding of Dx/Px/POC: good     Prognosis: good    Goals  STG  1. Independent with HEP in 2 weeks  2. Decrease pain at worst by 50% in 2 weeks    LTG  1. Increase throacic strength in all planes to 5/5 in 4 weeks  2. Return to full, pain-free and unrestricted walking for general health and fitnes in 4 weeks        Plan  Patient would benefit from: skilled physical therapy  Planned modality interventions: thermotherapy: hydrocollator packs    Planned therapy interventions: manual therapy, neuromuscular re-education, therapeutic activities and therapeutic exercise    Frequency: 1x week  Duration in weeks: 4  Treatment plan discussed with: patient    Subjective Evaluation    History of Present Illness  Mechanism of injury: Pt reports chronic history > 20 years of insidious mid and low back pain. Worse with recent trial of exercise. Pt  trying to do a mile walk and feels so much pain that he cannot move the next. Pt reports sitting for work. Pt denies pain with coughing/sneezing, no pain at night, no recent bowel or bladder issues, no hx CA.   Patient Goals  Patient goal: painfree walking - return to exercise routine  Pain  Current pain ratin  At best pain rating: 3  At worst pain rating: 10  Location: uppper, mid and low back  Quality: dull ache, sharp and tight  Relieving factors: medications  Aggravating factors: walking, standing and sitting  Progression: worsening      Diagnostic Tests  No diagnostic tests performed  Treatments  Previous treatment: chiropractic and massage      Objective     Active Range of Motion   Cervical/Thoracic Spine       Cervical  Subcranial protraction:  WFL   Subcranial retraction:  WFL   Flexion:  WFL  Extension:  WFL  Left lateral flexion:  WFL  Right lateral flexion:  WFL  Left rotation:  WFL  Right rotation:  WFL    Thoracic    Flexion:  with pain Restriction level: moderate  Extension:  with pain Restriction level: maximal  Left lateral flexion:  Restriction level: moderate  Right lateral flexion:  Restriction level: moderate  Left rotation:  Restriction level: moderate  Right rotation:  with pain Restriction level: moderate    Joint Play   Joints within functional limits: C1, C2, C3, C4, C5 and C6     Hypomobile: C7, T1, T2, T3, T4, T5, T6, T7, T8, T9, T10, T11 and T12     Pain: C7, T1, T2, T3, T4, T5, T6, T7, T8, T9, T10, T11 and T12     Strength/Myotome Testing   Cervical Spine     Left   Normal strength    Right   Normal strength    Tests   Cervical   Negative repeated extension, repeated flexion and neck flexor muscle endurance test.     Left   Negative Spurling's Test A, Spurling's Test B and cervical flexion-rotation test.     Right   Negative Spurling's Test A and Spurling's Test B.     Thoracic   Negative slump test.            EPOC: 2024      Manuals             G4 thoracic PA chelle NS 5'  "                                                  Neuro Re-Ed                                                                                                        Ther Ex             Thoracic ext over chair 10x10\" x2 positions            Thoracic rotation stretch (open book) 10x10\" ea                                                                                          Ther Activity             Patient education: pathoanatomy, nature of sxs, POC, HEP NS 8'                         Gait Training                                       Modalities                                            "

## 2024-09-23 DIAGNOSIS — J45.20 MILD INTERMITTENT ASTHMA WITHOUT COMPLICATION: ICD-10-CM

## 2024-09-23 DIAGNOSIS — R05.3 POST-COVID CHRONIC COUGH: ICD-10-CM

## 2024-09-23 DIAGNOSIS — U09.9 POST-COVID CHRONIC COUGH: ICD-10-CM

## 2024-09-23 NOTE — TELEPHONE ENCOUNTER
Reason for call:   [x] Refill   [] Prior Auth  [] Other:     Office:   [x] PCP/Provider - [facility]    [] Specialty/Provider -     Medication: Symbicort 160-4.5 MCG/ACT inhaler     Dose/Frequency: 2 puff 2 times daily prn     Quantity: 10.2 g    Pharmacy: [facility][id number]    Does the patient have enough for 3 days?   [x] Yes   [] No - Send as HP to POD

## 2024-09-24 RX ORDER — BUDESONIDE AND FORMOTEROL FUMARATE DIHYDRATE 160; 4.5 UG/1; UG/1
2 AEROSOL RESPIRATORY (INHALATION) 2 TIMES DAILY
Qty: 10.2 G | Refills: 5 | Status: SHIPPED | OUTPATIENT
Start: 2024-09-24

## 2024-09-25 ENCOUNTER — OFFICE VISIT (OUTPATIENT)
Age: 44
End: 2024-09-25
Payer: COMMERCIAL

## 2024-09-25 VITALS
OXYGEN SATURATION: 98 % | DIASTOLIC BLOOD PRESSURE: 84 MMHG | HEIGHT: 73 IN | WEIGHT: 315 LBS | BODY MASS INDEX: 41.75 KG/M2 | HEART RATE: 75 BPM | SYSTOLIC BLOOD PRESSURE: 120 MMHG

## 2024-09-25 DIAGNOSIS — E66.01 MORBID OBESITY (HCC): Chronic | ICD-10-CM

## 2024-09-25 DIAGNOSIS — G47.30 SLEEP DISORDER BREATHING: ICD-10-CM

## 2024-09-25 DIAGNOSIS — G47.33 OSA (OBSTRUCTIVE SLEEP APNEA): Primary | ICD-10-CM

## 2024-09-25 DIAGNOSIS — G47.19 EXCESSIVE DAYTIME SLEEPINESS: ICD-10-CM

## 2024-09-25 PROCEDURE — 99214 OFFICE O/P EST MOD 30 MIN: CPT | Performed by: INTERNAL MEDICINE

## 2024-09-25 NOTE — PROGRESS NOTES
Pulmonary/Sleep Follow Up Note   Justin Piper 43 y.o. male MRN: [id number]  9/25/2024      Assessment and Plan:    1. 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 Morbid obesity (HCC)  BMI 50.35 noted he would benefit from weight loss          Return in about 1 year (around 9/25/2025).    History of Present Illness   HPI:  Justin Piper is a 43 y.o. male who is here for compliance visit he was seen in August 2023 he was diagnosed with history of severe ROGER, he underwent in lab diagnostic sleep study found to have severe ROGER underwent a titration study started on auto BiPAP with significant improvement of his symptoms.      Historical Information   Past Medical History:   Diagnosis Date    COVID-19     Diverticula of colon     Hypertension     Pneumonia      Past Surgical History:   Procedure Laterality Date    GUM SURGERY      HERNIA REPAIR      PILONIDAL CYST EXCISION      WISDOM TOOTH EXTRACTION       Family History   Problem Relation Age of Onset    Mental illness Mother     Coronary artery disease Father     Hypertension Father     Cancer Maternal Grandmother         stomach    Substance Abuse Neg Hx          Meds/Allergies     Current Outpatient Medications:     albuterol (PROVENTIL HFA,VENTOLIN HFA) 90 mcg/act inhaler, Inhale 2 puffs every 6 (six) hours as needed for wheezing, Disp: 8 g, Rfl: 5    budesonide-formoterol (Symbicort) 160-4.5 mcg/act inhaler, Inhale 2 puffs 2 (two) times a day Rinse mouth after use., Disp: 10.2 g, Rfl: 5    meloxicam (MOBIC) 15 mg tablet, Take 1 tablet (15 mg total) by mouth daily as needed for moderate pain, Disp: 30  "tablet, Rfl: 3    valsartan (DIOVAN) 160 mg tablet, TAKE 1 TABLET BY MOUTH EVERY DAY, Disp: 30 tablet, Rfl: 5    fenofibrate (TRICOR) 145 mg tablet, TAKE 1 TABLET BY MOUTH EVERY DAY, Disp: 90 tablet, Rfl: 3    methocarbamol (ROBAXIN) 750 mg tablet, Take 1 tablet (750 mg total) by mouth daily at bedtime, Disp: 30 tablet, Rfl: 1    predniSONE 10 mg tablet, 30 mg by mouth daily for 3 days, then 20 mg by mouth daily for 3 days, then 10 mg by mouth daily for 3 days, then stop, Disp: 18 tablet, Rfl: 0  No Known Allergies    Vitals: Blood pressure 120/84, pulse 75, height 6' 1\" (1.854 m), weight (!) 173 kg (381 lb 9.6 oz), SpO2 98%. Body mass index is 50.35 kg/m². Oxygen Therapy  SpO2: 98 %      Physical Exam  General:  Awake alert and oriented x 3, conversant without conversational dyspnea, NAD, normal affect  HEENT:   Sclera noninjected, nonicteric OU, Nares patent,  no craniofacial abnormalities  NECK:  Trachea midline, no accessory muscle use, no stridor,  JVP not elevated  CARDIAC: Reg, single s1/S2, no m/r/g  PULM: CTA bilaterally no wheezing, rhonchi or rales  ABD: Soft nontender, nondistended, no rebound, no rigidity, no guarding  EXT: No cyanosis, no clubbing, no edema, normal capillary refill  NEURO: no focal neurologic deficits, AAOx3, moving all extremities appropriately    Labs: I have personally reviewed pertinent lab results., ABG: No results found for: \"PHART\", \"IPN3JDR\", \"PO2ART\", \"QXU8OJJ\", \"O6OIVFOH\", \"BEART\", \"SOURCE\", BNP: No results found for: \"BNP\", CBC: No results found for: \"WBC\", \"HGB\", \"HCT\", \"MCV\", \"PLT\", \"ADJUSTEDWBC\", \"RBC\", \"MCH\", \"MCHC\", \"RDW\", \"MPV\", \"NRBC\", CMP: No results found for: \"SODIUM\", \"K\", \"CL\", \"CO2\", \"ANIONGAP\", \"BUN\", \"CREATININE\", \"GLUCOSE\", \"CALCIUM\", \"AST\", \"ALT\", \"ALKPHOS\", \"PROT\", \"BILITOT\", \"EGFR\", PT/INR: No results found for: \"PT\", \"INR\", Troponin: No results found for: \"TROPONINI\"  Lab Results   Component Value Date    WBC 9.4 02/23/2022    HGB 15.7 02/23/2022    HCT " "46.1 02/23/2022    MCV 89 02/23/2022     02/23/2022     Lab Results   Component Value Date    GLUCOSE 92 01/26/2017    CALCIUM 9.7 01/26/2017     01/26/2017    K 4.4 02/23/2022    CO2 22 02/23/2022     02/23/2022    BUN 14 02/23/2022    CREATININE 1.24 02/23/2022     No results found for: \"IGE\"  Lab Results   Component Value Date    ALT 26 02/23/2022    AST 21 02/23/2022    ALKPHOS 50 01/22/2017           Sleep studies: I have personally reviewed pertinent reports.    AHI 89.7 events/hr --> severe ROGER    Compliance report:I have personally reviewed pertinent reports.      Type of CPAP:  Auto BiPAP 15 +6                                   Percent usage: 100%                                   Average time used: 8 hrs 21 min                                  Time in large leak: no sig mask leak                                   Residual AHI: 1.1            Artie Vázquez MD  [facility]       Portions of the record may have been created with voice recognition software. Occasional wrong word or \"sound a like\" substitutions may have occurred due to the inherent limitations of voice recognition software. Read the chart carefully and recognize, using context, where substitutions have occurred.  "

## 2024-09-25 NOTE — PATIENT INSTRUCTIONS
"Patient Education     Sleep apnea in adults   The Basics   Written by the doctors and editors at [facility]   What is sleep apnea? -- Sleep apnea is a condition that makes you stop breathing for short periods while you are asleep. There are 2 types of sleep apnea. One is called \"obstructive sleep apnea.\" The other is called \"central sleep apnea.\"  In obstructive sleep apnea, you stop breathing because your throat narrows or closes (figure 1). In central sleep apnea, you stop breathing because your brain does not send the right signals to your muscles to make you breathe. When people talk about sleep apnea, they are usually referring to obstructive sleep apnea, which is what this article is about.  People with sleep apnea do not know that they stop breathing when they are asleep. But they do sometimes wake up startled or gasping for breath. They also often hear from loved ones that they snore.  What are the symptoms of sleep apnea? -- The main symptoms of sleep apnea are loud snoring, tiredness, and daytime sleepiness. Other symptoms can include:   Restless sleep   Waking up choking or gasping   Morning headaches, dry mouth, or sore throat   Waking up often to urinate   Waking up feeling unrested or groggy   Trouble thinking clearly or remembering things  Some people with sleep apnea don't have symptoms, or don't realize that they have them.  Should I see a doctor or nurse? -- Yes. If you think that you might have sleep apnea, see your doctor.  Is there a test for sleep apnea? -- Yes. First, your doctor or nurse will ask about your symptoms. If you have a bed partner, they might also ask that person if you snore or gasp in your sleep. If the doctor or nurse suspects that you have sleep apnea, they might send you for a \"sleep study.\"  Sleep studies can sometimes be done at home, but they are usually done in a sleep lab. For the study, you spend the night in the lab, and you are hooked up to different machines that " "monitor your heart rate, breathing, and other body functions. The results of the test tell your doctor or nurse if you have the disorder.  Is there anything I can do on my own to help my sleep apnea? -- Yes. Some things that might help:   Try to avoid sleeping on your back, if possible. This might help some people.   Lose weight, if you have excess body weight.   Get regular physical activity. This might help you lose weight. But even if it doesn't, being active is good for your health.   Avoid alcohol, especially in the evening. Alcohol can make sleep apnea worse.  How is sleep apnea treated? -- Treatment can include:   Weight loss - As mentioned above, weight loss can help if you have excess weight or obesity. But losing weight can be challenging, and it takes time to lose enough weight to help with your sleep apnea. Most people need other treatment while they work on losing weight.   CPAP - The most effective treatment for sleep apnea is a device that keeps your airway open while you sleep. Treatment with this device is called \"continuous positive airway pressure\" (\"CPAP\"). People getting CPAP wear a face mask at night that keeps them breathing (figure 2).  If your doctor or nurse recommends a CPAP machine, be patient about using it. The mask might seem uncomfortable to wear at first, and the machine might seem noisy, but using the machine can really help you. People with sleep apnea who use a CPAP machine feel more rested and generally feel better.  If CPAP does not work, your doctor might suggest other treatment. Options might include:   An oral device - This is a device that you wear in your mouth. It is called an \"oral appliance\" or \"mandibular advancement device.\" It helps keep your airway open while you sleep.   Hypoglossal nerve stimulation - This involves a procedure to implant a small device into your chest. The device has a wire that connects to the nerve under your tongue. While you are sleeping, it " sends an electrical signal that causes the tongue to push forward. This helps open up your airway.   Surgery to widen your airway - This might involve removing your tonsils or other tissue that blocks the airway.  Is sleep apnea dangerous? -- It can be. Risks include:   Accidents - People with sleep apnea do not get good-quality sleep, so they are often tired and not alert. This puts them at risk for car accidents and other types of accidents.   Other health problems - Studies show that people with sleep apnea are more likely than others to have high blood pressure, heart attacks, and other serious heart problems. Some people also have mood changes or depression.  In people with severe sleep apnea, getting treated (for example, with CPAP) can help lower these risks.  All topics are updated as new evidence becomes available and our peer review process is complete.  This topic retrieved from [employer] on: Feb 28, 2024.  Topic 90485 Version 18.0  Release: 32.2.4 - C32.58  © 2024 UpToDate, Inc. and/or its affiliates. All rights reserved.  figure 1: Airway in a person with sleep apnea     Normally, when a person sleeps, the airway remains open, and air can pass from the nose and mouth to the lungs. In a person with sleep apnea, parts of the throat and mouth drop into the airway and block off the flow of air. This can cause loud snoring and interrupt breathing for short periods.  Graphic 24387 Version 6.0  figure 2: Continuous positive airway pressure (CPAP) for sleep apnea     The CPAP mask gently blows air into your nose while you sleep. It puts just enough pressure on your airway to keep it from closing. The mask in this picture fits over just the nose. Other CPAP devices have masks that fit over the nose and mouth.  Graphic 81261 Version 5.0  Consumer Information Use and Disclaimer   Disclaimer: This generalized information is a limited summary of diagnosis, treatment, and/or medication information. It is not meant to  be comprehensive and should be used as a tool to help the user understand and/or assess potential diagnostic and treatment options. It does NOT include all information about conditions, treatments, medications, side effects, or risks that may apply to a specific patient. It is not intended to be medical advice or a substitute for the medical advice, diagnosis, or treatment of a health care provider based on the health care provider's examination and assessment of a patient's specific and unique circumstances. Patients must speak with a health care provider for complete information about their health, medical questions, and treatment options, including any risks or benefits regarding use of medications. This information does not endorse any treatments or medications as safe, effective, or approved for treating a specific patient. UpToDate, Inc. and its affiliates disclaim any warranty or liability relating to this information or the use thereof.The use of this information is governed by the Terms of Use, available at https://www.woltersBoqiiuwer.com/en/know/clinical-effectiveness-terms. 2024© UpToDate, Inc. and its affiliates and/or licensors. All rights reserved.  Copyright   © 2024 UpToDate, Inc. and/or its affiliates. All rights reserved.

## 2024-09-29 DIAGNOSIS — M54.6 THORACOLUMBAR BACK PAIN: ICD-10-CM

## 2024-09-29 DIAGNOSIS — I10 BENIGN ESSENTIAL HYPERTENSION: ICD-10-CM

## 2024-09-29 DIAGNOSIS — M54.50 THORACOLUMBAR BACK PAIN: ICD-10-CM

## 2024-09-30 RX ORDER — MELOXICAM 15 MG/1
15 TABLET ORAL DAILY PRN
Qty: 30 TABLET | Refills: 0 | Status: SHIPPED | OUTPATIENT
Start: 2024-09-30

## 2024-09-30 RX ORDER — VALSARTAN 160 MG/1
TABLET ORAL
Qty: 30 TABLET | Refills: 0 | Status: SHIPPED | OUTPATIENT
Start: 2024-09-30

## 2024-10-27 DIAGNOSIS — I10 BENIGN ESSENTIAL HYPERTENSION: ICD-10-CM

## 2024-10-27 DIAGNOSIS — M54.50 THORACOLUMBAR BACK PAIN: ICD-10-CM

## 2024-10-27 DIAGNOSIS — M54.6 THORACOLUMBAR BACK PAIN: ICD-10-CM

## 2024-10-29 RX ORDER — MELOXICAM 15 MG/1
15 TABLET ORAL DAILY PRN
Qty: 30 TABLET | Refills: 0 | Status: SHIPPED | OUTPATIENT
Start: 2024-10-29

## 2024-10-29 RX ORDER — VALSARTAN 160 MG/1
TABLET ORAL
Qty: 30 TABLET | Refills: 0 | Status: SHIPPED | OUTPATIENT
Start: 2024-10-29

## 2024-10-30 NOTE — TELEPHONE ENCOUNTER
Ldm on vm to have previous lab work done that was ordered and also to schedule an appt. Nov/dec (might have to be December no appts. Available)

## 2024-11-08 ENCOUNTER — TELEPHONE (OUTPATIENT)
Dept: FAMILY MEDICINE CLINIC | Facility: HOSPITAL | Age: 44
End: 2024-11-08

## 2024-11-08 DIAGNOSIS — E79.0 ELEVATED BLOOD URIC ACID LEVEL: Primary | ICD-10-CM

## 2024-11-08 LAB
ALBUMIN SERPL-MCNC: 4.7 G/DL (ref 4.1–5.1)
ALP SERPL-CCNC: 56 IU/L (ref 44–121)
ALT SERPL-CCNC: 33 IU/L (ref 0–44)
AST SERPL-CCNC: 24 IU/L (ref 0–40)
BASOPHILS # BLD AUTO: 0.1 X10E3/UL (ref 0–0.2)
BASOPHILS NFR BLD AUTO: 1 %
BILIRUB SERPL-MCNC: 1.2 MG/DL (ref 0–1.2)
BUN SERPL-MCNC: 16 MG/DL (ref 6–24)
BUN/CREAT SERPL: 13 (ref 9–20)
CALCIUM SERPL-MCNC: 9.6 MG/DL (ref 8.7–10.2)
CHLORIDE SERPL-SCNC: 102 MMOL/L (ref 96–106)
CHOLEST SERPL-MCNC: 201 MG/DL (ref 100–199)
CO2 SERPL-SCNC: 21 MMOL/L (ref 20–29)
CREAT SERPL-MCNC: 1.28 MG/DL (ref 0.76–1.27)
EGFR: 70 ML/MIN/1.73
EOSINOPHIL # BLD AUTO: 0.2 X10E3/UL (ref 0–0.4)
EOSINOPHIL NFR BLD AUTO: 2 %
ERYTHROCYTE [DISTWIDTH] IN BLOOD BY AUTOMATED COUNT: 12.5 % (ref 11.6–15.4)
GLOBULIN SER-MCNC: 2.9 G/DL (ref 1.5–4.5)
GLUCOSE SERPL-MCNC: 90 MG/DL (ref 70–99)
HCT VFR BLD AUTO: 49.5 % (ref 37.5–51)
HDLC SERPL-MCNC: 32 MG/DL
HGB BLD-MCNC: 16.6 G/DL (ref 13–17.7)
IMM GRANULOCYTES # BLD: 0 X10E3/UL (ref 0–0.1)
IMM GRANULOCYTES NFR BLD: 0 %
LDLC SERPL CALC-MCNC: 117 MG/DL (ref 0–99)
LDLC/HDLC SERPL: 3.7 RATIO (ref 0–3.6)
LYMPHOCYTES # BLD AUTO: 2.8 X10E3/UL (ref 0.7–3.1)
LYMPHOCYTES NFR BLD AUTO: 28 %
MCH RBC QN AUTO: 30.6 PG (ref 26.6–33)
MCHC RBC AUTO-ENTMCNC: 33.5 G/DL (ref 31.5–35.7)
MCV RBC AUTO: 91 FL (ref 79–97)
MONOCYTES # BLD AUTO: 0.6 X10E3/UL (ref 0.1–0.9)
MONOCYTES NFR BLD AUTO: 6 %
NEUTROPHILS # BLD AUTO: 6.1 X10E3/UL (ref 1.4–7)
NEUTROPHILS NFR BLD AUTO: 63 %
PLATELET # BLD AUTO: 319 X10E3/UL (ref 150–450)
POTASSIUM SERPL-SCNC: 4.5 MMOL/L (ref 3.5–5.2)
PROT SERPL-MCNC: 7.6 G/DL (ref 6–8.5)
RBC # BLD AUTO: 5.42 X10E6/UL (ref 4.14–5.8)
SL AMB VLDL CHOLESTEROL CALC: 52 MG/DL (ref 5–40)
SODIUM SERPL-SCNC: 142 MMOL/L (ref 134–144)
TRIGL SERPL-MCNC: 294 MG/DL (ref 0–149)
TSH SERPL DL<=0.005 MIU/L-ACNC: 1.69 UIU/ML (ref 0.45–4.5)
URATE SERPL-MCNC: 8.6 MG/DL (ref 3.8–8.4)
WBC # BLD AUTO: 9.8 X10E3/UL (ref 3.4–10.8)

## 2024-11-08 RX ORDER — ALLOPURINOL 100 MG/1
200 TABLET ORAL DAILY
Qty: 60 TABLET | Refills: 5 | Status: SHIPPED | OUTPATIENT
Start: 2024-11-08

## 2024-11-08 NOTE — TELEPHONE ENCOUNTER
Left message with result  Asked him to return the call to advise if he is experiencing any joint pain or swelling.     Please let office clinical staff know when the call is returned.

## 2024-11-08 NOTE — TELEPHONE ENCOUNTER
Patient returned call and stated he does not have any joint swelling just tightness at times in hands and feet. Read message from provider.

## 2024-11-08 NOTE — TELEPHONE ENCOUNTER
----- Message from Precious Osorio DO sent at 11/8/2024 12:45 PM EST -----  High uric acid level-will start on allopurinol 200 mg at at bedtime-  Check if he is having any acute joint pains or swelling that would suggest gout at this time

## 2024-11-12 ENCOUNTER — OFFICE VISIT (OUTPATIENT)
Dept: FAMILY MEDICINE CLINIC | Facility: HOSPITAL | Age: 44
End: 2024-11-12
Payer: COMMERCIAL

## 2024-11-12 VITALS
WEIGHT: 315 LBS | BODY MASS INDEX: 41.75 KG/M2 | OXYGEN SATURATION: 98 % | HEART RATE: 95 BPM | HEIGHT: 73 IN | SYSTOLIC BLOOD PRESSURE: 124 MMHG | DIASTOLIC BLOOD PRESSURE: 86 MMHG

## 2024-11-12 DIAGNOSIS — J45.20 MILD INTERMITTENT ASTHMA WITHOUT COMPLICATION: ICD-10-CM

## 2024-11-12 DIAGNOSIS — G47.33 OSA (OBSTRUCTIVE SLEEP APNEA): ICD-10-CM

## 2024-11-12 DIAGNOSIS — Z11.4 SCREENING FOR HIV (HUMAN IMMUNODEFICIENCY VIRUS): ICD-10-CM

## 2024-11-12 DIAGNOSIS — Z12.11 SCREEN FOR COLON CANCER: ICD-10-CM

## 2024-11-12 DIAGNOSIS — E78.1 HYPERTRIGLYCERIDEMIA: ICD-10-CM

## 2024-11-12 DIAGNOSIS — I10 BENIGN ESSENTIAL HYPERTENSION: ICD-10-CM

## 2024-11-12 DIAGNOSIS — E66.01 MORBID OBESITY (HCC): Primary | Chronic | ICD-10-CM

## 2024-11-12 DIAGNOSIS — E79.0 ELEVATED BLOOD URIC ACID LEVEL: ICD-10-CM

## 2024-11-12 DIAGNOSIS — Z11.59 NEED FOR HEPATITIS C SCREENING TEST: ICD-10-CM

## 2024-11-12 PROCEDURE — 99214 OFFICE O/P EST MOD 30 MIN: CPT | Performed by: INTERNAL MEDICINE

## 2024-11-12 NOTE — ASSESSMENT & PLAN NOTE
Has lost 30 lbs since this summer- cut out soda- was drinking over 2 liter daily  Down from 381 to 366 form  september

## 2024-11-12 NOTE — PROGRESS NOTES
Assessment/Plan:     Diagnosis ICD-10-CM Associated Orders   1. Morbid obesity (HCC)  E66.01       2. Benign essential hypertension  I10 Hemoglobin A1C     Comprehensive metabolic panel     Hemoglobin A1C     Comprehensive metabolic panel      3. Mild intermittent asthma without complication  J45.20       4. ROGER (obstructive sleep apnea)  G47.33       5. Elevated blood uric acid level  E79.0 Uric acid     Uric acid      6. Hypertriglyceridemia  E78.1 Hemoglobin A1C     Hemoglobin A1C          Problem List Items Addressed This Visit          Cardiovascular and Mediastinum    Benign essential hypertension     Bp  stable today-valsartan 160 mg daily         Relevant Orders    Hemoglobin A1C    Comprehensive metabolic panel       Respiratory    Mild intermittent asthma without complication     On symbicort bid and prn albuterol         ROGER (obstructive sleep apnea)     Stable on cpap 15 and following with Dr. Vázquez            Other    Morbid obesity (HCC) - Primary (Chronic)      Has lost 30 lbs since this summer- cut out soda- was drinking over 2 liter daily  Down from 381 to 366 form  september         Elevated blood uric acid level     Up at 3 am for work- will take allopurinol 200 mg for gout prevention         Relevant Orders    Uric acid    Hypertriglyceridemia    Relevant Orders    Hemoglobin A1C         No follow-ups on file.      Subjective:    Patient ID: Justin Piper is a 44 y.o. male    Here for follow up   Labs reviewed- had elevated uric acid at 8.5 but no acute changes- discussed limiting shrimp and etoh as well as red meats    Will reepat levels in 3 months on allopurinol 200 mg at hs   Pleased with weight loss effort        The following portions of the patient's history were reviewed and updated as appropriate: allergies, current medications and problem list.     Review of Systems   Constitutional:  Negative for fatigue.   HENT:  Negative for congestion and mouth sores.    Respiratory:  Negative  "for cough.    Cardiovascular:  Negative for chest pain and palpitations.   Gastrointestinal:  Negative for abdominal pain.   Genitourinary:  Negative for difficulty urinating.   Musculoskeletal:  Negative for arthralgias and back pain.   Neurological:  Negative for dizziness and syncope.   All other systems reviewed and are negative.        Objective:      Current Outpatient Medications:     albuterol (PROVENTIL HFA,VENTOLIN HFA) 90 mcg/act inhaler, Inhale 2 puffs every 6 (six) hours as needed for wheezing, Disp: 8 g, Rfl: 5    allopurinol (ZYLOPRIM) 100 mg tablet, Take 2 tablets (200 mg total) by mouth daily, Disp: 60 tablet, Rfl: 5    budesonide-formoterol (Symbicort) 160-4.5 mcg/act inhaler, Inhale 2 puffs 2 (two) times a day Rinse mouth after use., Disp: 10.2 g, Rfl: 5    meloxicam (MOBIC) 15 mg tablet, TAKE 1 TABLET BY MOUTH DAILY AS NEEDED FOR MODERATE PAIN., Disp: 30 tablet, Rfl: 0    methocarbamol (ROBAXIN) 750 mg tablet, Take 1 tablet (750 mg total) by mouth daily at bedtime, Disp: 30 tablet, Rfl: 1    valsartan (DIOVAN) 160 mg tablet, TAKE 1 TABLET BY MOUTH EVERY DAY, Disp: 30 tablet, Rfl: 0    fenofibrate (TRICOR) 145 mg tablet, TAKE 1 TABLET BY MOUTH EVERY DAY, Disp: 90 tablet, Rfl: 3    Blood pressure 124/86, pulse 95, height 6' 1\" (1.854 m), weight (!) 166 kg (366 lb 9.6 oz), SpO2 98%.     Physical Exam  Vitals and nursing note reviewed.   Constitutional:       Appearance: He is obese. He is not toxic-appearing.   HENT:      Head: Normocephalic and atraumatic.      Right Ear: Tympanic membrane normal. There is no impacted cerumen.      Left Ear: Tympanic membrane normal. There is no impacted cerumen.      Nose: No congestion.      Mouth/Throat:      Pharynx: No posterior oropharyngeal erythema.   Eyes:      General: No scleral icterus.        Right eye: No discharge.         Left eye: No discharge.   Cardiovascular:      Rate and Rhythm: Normal rate and regular rhythm.      Heart sounds: No murmur " heard.  Pulmonary:      Breath sounds: No wheezing or rhonchi.   Abdominal:      Palpations: Abdomen is soft.      Tenderness: There is no abdominal tenderness.   Musculoskeletal:         General: No swelling or tenderness.      Cervical back: No rigidity or tenderness.      Right lower leg: No edema.      Left lower leg: No edema.   Skin:     Findings: No erythema.   Neurological:      General: No focal deficit present.      Mental Status: He is alert.      Motor: No weakness.   Psychiatric:         Mood and Affect: Mood normal.         Thought Content: Thought content normal.         Judgment: Judgment normal.

## 2024-11-12 NOTE — PATIENT INSTRUCTIONS
Had colonoscopy  with Dr. Harris- had diverticulitis and IBS - had hosptilizations for  dehydration

## 2024-11-27 DIAGNOSIS — I10 BENIGN ESSENTIAL HYPERTENSION: ICD-10-CM

## 2024-11-27 DIAGNOSIS — M54.6 THORACOLUMBAR BACK PAIN: ICD-10-CM

## 2024-11-27 DIAGNOSIS — M54.50 THORACOLUMBAR BACK PAIN: ICD-10-CM

## 2024-11-27 RX ORDER — VALSARTAN 160 MG/1
160 TABLET ORAL DAILY
Qty: 90 TABLET | Refills: 1 | Status: SHIPPED | OUTPATIENT
Start: 2024-11-27

## 2024-11-27 RX ORDER — MELOXICAM 15 MG/1
15 TABLET ORAL DAILY PRN
Qty: 90 TABLET | Refills: 1 | Status: SHIPPED | OUTPATIENT
Start: 2024-11-27

## 2024-11-30 DIAGNOSIS — E79.0 ELEVATED BLOOD URIC ACID LEVEL: ICD-10-CM

## 2024-12-02 RX ORDER — ALLOPURINOL 100 MG/1
200 TABLET ORAL DAILY
Qty: 180 TABLET | Refills: 2 | Status: SHIPPED | OUTPATIENT
Start: 2024-12-02

## 2025-03-23 DIAGNOSIS — U09.9 POST-COVID CHRONIC COUGH: ICD-10-CM

## 2025-03-23 DIAGNOSIS — R05.3 POST-COVID CHRONIC COUGH: ICD-10-CM

## 2025-03-23 DIAGNOSIS — J45.20 MILD INTERMITTENT ASTHMA WITHOUT COMPLICATION: ICD-10-CM

## 2025-03-24 RX ORDER — BUDESONIDE AND FORMOTEROL FUMARATE DIHYDRATE 160; 4.5 UG/1; UG/1
AEROSOL RESPIRATORY (INHALATION)
Qty: 10.2 G | Refills: 5 | Status: SHIPPED | OUTPATIENT
Start: 2025-03-24

## 2025-05-03 ENCOUNTER — RESULTS FOLLOW-UP (OUTPATIENT)
Dept: FAMILY MEDICINE CLINIC | Facility: HOSPITAL | Age: 45
End: 2025-05-03

## 2025-05-03 LAB
ALBUMIN SERPL-MCNC: 4.5 G/DL (ref 4.1–5.1)
ALP SERPL-CCNC: 49 IU/L (ref 44–121)
ALT SERPL-CCNC: 34 IU/L (ref 0–44)
AST SERPL-CCNC: 24 IU/L (ref 0–40)
BILIRUB SERPL-MCNC: 1 MG/DL (ref 0–1.2)
BUN SERPL-MCNC: 11 MG/DL (ref 6–24)
BUN/CREAT SERPL: 12 (ref 9–20)
CALCIUM SERPL-MCNC: 9.1 MG/DL (ref 8.7–10.2)
CHLORIDE SERPL-SCNC: 105 MMOL/L (ref 96–106)
CO2 SERPL-SCNC: 21 MMOL/L (ref 20–29)
CREAT SERPL-MCNC: 0.94 MG/DL (ref 0.76–1.27)
EGFR: 101 ML/MIN/1.73
EST. AVERAGE GLUCOSE BLD GHB EST-MCNC: 108 MG/DL
GLOBULIN SER-MCNC: 2.8 G/DL (ref 1.5–4.5)
GLUCOSE SERPL-MCNC: 87 MG/DL (ref 70–99)
HBA1C MFR BLD: 5.4 % (ref 4.8–5.6)
HCV AB S/CO SERPL IA: NON REACTIVE
HIV 1+2 AB+HIV1 P24 AG SERPL QL IA: NON REACTIVE
POTASSIUM SERPL-SCNC: 4.2 MMOL/L (ref 3.5–5.2)
PROT SERPL-MCNC: 7.3 G/DL (ref 6–8.5)
SODIUM SERPL-SCNC: 140 MMOL/L (ref 134–144)
URATE SERPL-MCNC: 8.4 MG/DL (ref 3.8–8.4)

## 2025-05-18 DIAGNOSIS — I10 BENIGN ESSENTIAL HYPERTENSION: ICD-10-CM

## 2025-05-18 RX ORDER — VALSARTAN 160 MG/1
160 TABLET ORAL DAILY
Qty: 30 TABLET | Refills: 5 | Status: SHIPPED | OUTPATIENT
Start: 2025-05-18

## 2025-08-15 ENCOUNTER — OFFICE VISIT (OUTPATIENT)
Dept: FAMILY MEDICINE CLINIC | Facility: HOSPITAL | Age: 45
End: 2025-08-15
Payer: COMMERCIAL

## 2025-08-15 ENCOUNTER — HOSPITAL ENCOUNTER (OUTPATIENT)
Dept: RADIOLOGY | Facility: HOSPITAL | Age: 45
End: 2025-08-15
Payer: COMMERCIAL

## 2025-08-20 ENCOUNTER — TELEPHONE (OUTPATIENT)
Age: 45
End: 2025-08-20